# Patient Record
Sex: FEMALE | Race: WHITE | NOT HISPANIC OR LATINO | Employment: FULL TIME | URBAN - METROPOLITAN AREA
[De-identification: names, ages, dates, MRNs, and addresses within clinical notes are randomized per-mention and may not be internally consistent; named-entity substitution may affect disease eponyms.]

---

## 2017-01-30 ENCOUNTER — OFFICE VISIT - HEALTHEAST (OUTPATIENT)
Dept: FAMILY MEDICINE | Facility: CLINIC | Age: 47
End: 2017-01-30

## 2017-01-30 DIAGNOSIS — R53.83 FATIGUE: ICD-10-CM

## 2017-01-30 DIAGNOSIS — E03.9 HYPOTHYROIDISM: ICD-10-CM

## 2017-01-30 DIAGNOSIS — N92.0 EXCESSIVE OR FREQUENT MENSTRUATION: ICD-10-CM

## 2017-01-30 DIAGNOSIS — F41.9 ANXIETY: ICD-10-CM

## 2017-01-30 DIAGNOSIS — N76.0 VAGINITIS: ICD-10-CM

## 2017-01-31 ENCOUNTER — AMBULATORY - HEALTHEAST (OUTPATIENT)
Dept: FAMILY MEDICINE | Facility: CLINIC | Age: 47
End: 2017-01-31

## 2017-01-31 ENCOUNTER — COMMUNICATION - HEALTHEAST (OUTPATIENT)
Dept: FAMILY MEDICINE | Facility: CLINIC | Age: 47
End: 2017-01-31

## 2017-01-31 DIAGNOSIS — D64.9 ANEMIA: ICD-10-CM

## 2017-01-31 LAB
BASOPHILS # BLD AUTO: 0.1 THOU/UL (ref 0–0.2)
BASOPHILS NFR BLD AUTO: 1 % (ref 0–2)
EOSINOPHIL # BLD AUTO: 0.1 THOU/UL (ref 0–0.4)
EOSINOPHIL NFR BLD AUTO: 2 % (ref 0–6)
ERYTHROCYTE [DISTWIDTH] IN BLOOD BY AUTOMATED COUNT: 16.8 % (ref 11–14.5)
HCT VFR BLD AUTO: 30.5 % (ref 35–47)
HGB BLD-MCNC: 8.8 G/DL (ref 12–16)
LAB AP CHARGES (HE HISTORICAL CONVERSION): NORMAL
LYMPHOCYTES # BLD AUTO: 1.9 THOU/UL (ref 0.8–4.4)
LYMPHOCYTES NFR BLD AUTO: 23 % (ref 20–40)
MCH RBC QN AUTO: 20 PG (ref 27–34)
MCHC RBC AUTO-ENTMCNC: 28.9 G/DL (ref 32–36)
MCV RBC AUTO: 69 FL (ref 80–100)
MONOCYTES # BLD AUTO: 0.5 THOU/UL (ref 0–0.9)
MONOCYTES NFR BLD AUTO: 6 % (ref 2–10)
NEUTROPHILS # BLD AUTO: 5.3 THOU/UL (ref 2–7.7)
NEUTROPHILS NFR BLD AUTO: 67 % (ref 50–70)
PATH REPORT.COMMENTS IMP SPEC: NORMAL
PATH REPORT.COMMENTS IMP SPEC: NORMAL
PATH REPORT.FINAL DX SPEC: NORMAL
PATH REPORT.MICROSCOPIC SPEC OTHER STN: ABNORMAL
PATH REPORT.MICROSCOPIC SPEC OTHER STN: NORMAL
PATH REPORT.RELEVANT HX SPEC: NORMAL
PLATELET # BLD AUTO: 535 THOU/UL (ref 140–440)
PMV BLD AUTO: 9.8 FL (ref 8.5–12.5)
RBC # BLD AUTO: 4.41 MILL/UL (ref 3.8–5.4)
WBC: 7.9 THOU/UL (ref 4–11)

## 2017-02-01 ENCOUNTER — COMMUNICATION - HEALTHEAST (OUTPATIENT)
Dept: ONCOLOGY | Facility: CLINIC | Age: 47
End: 2017-02-01

## 2017-02-21 ENCOUNTER — OFFICE VISIT - HEALTHEAST (OUTPATIENT)
Dept: ONCOLOGY | Facility: CLINIC | Age: 47
End: 2017-02-21

## 2017-02-21 DIAGNOSIS — D50.9 IRON DEFICIENCY ANEMIA, UNSPECIFIED IRON DEFICIENCY ANEMIA TYPE: ICD-10-CM

## 2017-02-21 DIAGNOSIS — D50.9 IRON DEFICIENCY ANEMIA: ICD-10-CM

## 2017-03-01 ENCOUNTER — AMBULATORY - HEALTHEAST (OUTPATIENT)
Dept: LAB | Facility: HOSPITAL | Age: 47
End: 2017-03-01

## 2017-03-01 ENCOUNTER — COMMUNICATION - HEALTHEAST (OUTPATIENT)
Dept: ONCOLOGY | Facility: CLINIC | Age: 47
End: 2017-03-01

## 2017-03-01 DIAGNOSIS — D64.9 ANEMIA: ICD-10-CM

## 2017-03-02 ENCOUNTER — COMMUNICATION - HEALTHEAST (OUTPATIENT)
Dept: ONCOLOGY | Facility: CLINIC | Age: 47
End: 2017-03-02

## 2017-03-02 ENCOUNTER — AMBULATORY - HEALTHEAST (OUTPATIENT)
Dept: ONCOLOGY | Facility: CLINIC | Age: 47
End: 2017-03-02

## 2017-03-02 DIAGNOSIS — D64.9 ANEMIA: ICD-10-CM

## 2017-04-05 ENCOUNTER — RECORDS - HEALTHEAST (OUTPATIENT)
Dept: ADMINISTRATIVE | Facility: OTHER | Age: 47
End: 2017-04-05

## 2017-04-18 ENCOUNTER — COMMUNICATION - HEALTHEAST (OUTPATIENT)
Dept: FAMILY MEDICINE | Facility: CLINIC | Age: 47
End: 2017-04-18

## 2017-04-19 ENCOUNTER — OFFICE VISIT - HEALTHEAST (OUTPATIENT)
Dept: FAMILY MEDICINE | Facility: CLINIC | Age: 47
End: 2017-04-19

## 2017-04-19 DIAGNOSIS — Z00.00 ROUTINE GENERAL MEDICAL EXAMINATION AT A HEALTH CARE FACILITY: ICD-10-CM

## 2017-04-19 DIAGNOSIS — D22.9 ATYPICAL NEVI: ICD-10-CM

## 2017-04-19 DIAGNOSIS — F41.9 ANXIETY: ICD-10-CM

## 2017-04-19 DIAGNOSIS — E01.0 THYROMEGALY: ICD-10-CM

## 2017-04-19 ASSESSMENT — MIFFLIN-ST. JEOR: SCORE: 1363.46

## 2017-04-21 ENCOUNTER — COMMUNICATION - HEALTHEAST (OUTPATIENT)
Dept: ONCOLOGY | Facility: CLINIC | Age: 47
End: 2017-04-21

## 2017-04-24 ENCOUNTER — HOSPITAL ENCOUNTER (OUTPATIENT)
Dept: ULTRASOUND IMAGING | Facility: HOSPITAL | Age: 47
Discharge: HOME | End: 2017-04-24
Attending: FAMILY MEDICINE

## 2017-04-24 ENCOUNTER — HOSPITAL ENCOUNTER (OUTPATIENT)
Dept: MAMMOGRAPHY | Facility: HOSPITAL | Age: 47
Discharge: HOME | End: 2017-04-24
Attending: FAMILY MEDICINE

## 2017-04-24 DIAGNOSIS — E04.9 NONTOXIC GOITER, UNSPECIFIED: ICD-10-CM

## 2017-04-24 DIAGNOSIS — E01.0 THYROMEGALY: ICD-10-CM

## 2017-04-24 DIAGNOSIS — Z00.00 ROUTINE GENERAL MEDICAL EXAMINATION AT A HEALTH CARE FACILITY: ICD-10-CM

## 2017-04-24 LAB
HPV INTERPRETATION - HISTORICAL: NORMAL
HPV INTERPRETER - HISTORICAL: NORMAL

## 2017-04-26 ENCOUNTER — AMBULATORY - HEALTHEAST (OUTPATIENT)
Dept: ONCOLOGY | Facility: CLINIC | Age: 47
End: 2017-04-26

## 2017-04-26 DIAGNOSIS — D50.9 IRON DEFICIENCY ANEMIA: ICD-10-CM

## 2017-04-26 LAB
BKR LAB AP ABNORMAL BLEEDING: NO
BKR LAB AP BIRTH CONTROL/HORMONES: NORMAL
BKR LAB AP CERVICAL APPEARANCE: NORMAL
BKR LAB AP GYN ADEQUACY: NORMAL
BKR LAB AP GYN INTERPRETATION: NORMAL
BKR LAB AP HPV REFLEX: NORMAL
BKR LAB AP LMP: NORMAL
BKR LAB AP PATIENT STATUS: NO
BKR LAB AP PREVIOUS ABNORMAL: NO
BKR LAB AP PREVIOUS NORMAL: NORMAL
HIGH RISK?: NO
PATH REPORT.COMMENTS IMP SPEC: NORMAL
RESULT FLAG (HE HISTORICAL CONVERSION): NORMAL

## 2017-05-03 ENCOUNTER — AMBULATORY - HEALTHEAST (OUTPATIENT)
Dept: FAMILY MEDICINE | Facility: CLINIC | Age: 47
End: 2017-05-03

## 2017-05-03 DIAGNOSIS — E04.2 MULTIPLE THYROID NODULES: ICD-10-CM

## 2017-05-30 ENCOUNTER — RECORDS - HEALTHEAST (OUTPATIENT)
Dept: ADMINISTRATIVE | Facility: OTHER | Age: 47
End: 2017-05-30

## 2017-07-10 ENCOUNTER — COMMUNICATION - HEALTHEAST (OUTPATIENT)
Dept: ONCOLOGY | Facility: CLINIC | Age: 47
End: 2017-07-10

## 2017-07-10 ENCOUNTER — AMBULATORY - HEALTHEAST (OUTPATIENT)
Dept: FAMILY MEDICINE | Facility: CLINIC | Age: 47
End: 2017-07-10

## 2017-07-10 ENCOUNTER — COMMUNICATION - HEALTHEAST (OUTPATIENT)
Dept: FAMILY MEDICINE | Facility: CLINIC | Age: 47
End: 2017-07-10

## 2017-07-10 DIAGNOSIS — N28.9 RENAL INSUFFICIENCY: ICD-10-CM

## 2017-07-11 ENCOUNTER — HOSPITAL ENCOUNTER (OUTPATIENT)
Dept: ULTRASOUND IMAGING | Facility: HOSPITAL | Age: 47
Discharge: HOME | End: 2017-07-11
Attending: INTERNAL MEDICINE

## 2017-07-11 DIAGNOSIS — D50.9 IRON DEFICIENCY ANEMIA: ICD-10-CM

## 2017-07-29 ENCOUNTER — HEALTH MAINTENANCE LETTER (OUTPATIENT)
Age: 47
End: 2017-07-29

## 2017-08-07 ENCOUNTER — COMMUNICATION - HEALTHEAST (OUTPATIENT)
Dept: FAMILY MEDICINE | Facility: CLINIC | Age: 47
End: 2017-08-07

## 2017-08-07 DIAGNOSIS — F41.9 ANXIETY: ICD-10-CM

## 2017-12-07 ENCOUNTER — COMMUNICATION - HEALTHEAST (OUTPATIENT)
Dept: FAMILY MEDICINE | Facility: CLINIC | Age: 47
End: 2017-12-07

## 2018-02-27 ENCOUNTER — COMMUNICATION - HEALTHEAST (OUTPATIENT)
Dept: FAMILY MEDICINE | Facility: CLINIC | Age: 48
End: 2018-02-27

## 2018-05-06 ENCOUNTER — COMMUNICATION - HEALTHEAST (OUTPATIENT)
Dept: FAMILY MEDICINE | Facility: CLINIC | Age: 48
End: 2018-05-06

## 2018-05-06 DIAGNOSIS — F41.9 ANXIETY: ICD-10-CM

## 2018-10-26 ENCOUNTER — COMMUNICATION - HEALTHEAST (OUTPATIENT)
Dept: FAMILY MEDICINE | Facility: CLINIC | Age: 48
End: 2018-10-26

## 2018-10-26 ENCOUNTER — OFFICE VISIT - HEALTHEAST (OUTPATIENT)
Dept: FAMILY MEDICINE | Facility: CLINIC | Age: 48
End: 2018-10-26

## 2018-10-26 DIAGNOSIS — Z12.31 VISIT FOR SCREENING MAMMOGRAM: ICD-10-CM

## 2018-10-26 DIAGNOSIS — F41.9 ANXIETY: ICD-10-CM

## 2018-11-16 ENCOUNTER — HOSPITAL ENCOUNTER (OUTPATIENT)
Dept: MAMMOGRAPHY | Facility: CLINIC | Age: 48
Discharge: HOME | End: 2018-11-16
Attending: NURSE PRACTITIONER

## 2018-11-16 DIAGNOSIS — Z12.31 VISIT FOR SCREENING MAMMOGRAM: ICD-10-CM

## 2019-01-10 ENCOUNTER — COMMUNICATION - HEALTHEAST (OUTPATIENT)
Dept: FAMILY MEDICINE | Facility: CLINIC | Age: 49
End: 2019-01-10

## 2019-01-10 DIAGNOSIS — F41.9 ANXIETY: ICD-10-CM

## 2019-02-26 ENCOUNTER — OFFICE VISIT - HEALTHEAST (OUTPATIENT)
Dept: FAMILY MEDICINE | Facility: CLINIC | Age: 49
End: 2019-02-26

## 2019-03-01 ENCOUNTER — AMBULATORY - HEALTHEAST (OUTPATIENT)
Dept: FAMILY MEDICINE | Facility: CLINIC | Age: 49
End: 2019-03-01

## 2019-03-01 ENCOUNTER — OFFICE VISIT - HEALTHEAST (OUTPATIENT)
Dept: FAMILY MEDICINE | Facility: CLINIC | Age: 49
End: 2019-03-01

## 2019-03-01 DIAGNOSIS — G43.109 MIGRAINE WITH AURA AND WITHOUT STATUS MIGRAINOSUS, NOT INTRACTABLE: ICD-10-CM

## 2019-03-01 DIAGNOSIS — N89.8 VAGINAL DISCHARGE: ICD-10-CM

## 2019-03-01 LAB
CLUE CELLS: ABNORMAL
TRICHOMONAS, WET PREP: ABNORMAL
YEAST, WET PREP: ABNORMAL

## 2019-03-12 ENCOUNTER — COMMUNICATION - HEALTHEAST (OUTPATIENT)
Dept: SCHEDULING | Facility: CLINIC | Age: 49
End: 2019-03-12

## 2019-03-19 ENCOUNTER — OFFICE VISIT - HEALTHEAST (OUTPATIENT)
Dept: FAMILY MEDICINE | Facility: CLINIC | Age: 49
End: 2019-03-19

## 2019-03-19 DIAGNOSIS — R09.A2 GLOBUS SENSATION: ICD-10-CM

## 2019-03-19 DIAGNOSIS — E04.1 THYROID NODULE: ICD-10-CM

## 2019-03-19 DIAGNOSIS — N92.0 SPOTTING: ICD-10-CM

## 2019-03-19 DIAGNOSIS — F41.9 ANXIETY: ICD-10-CM

## 2019-03-19 DIAGNOSIS — D64.9 ANEMIA, UNSPECIFIED TYPE: ICD-10-CM

## 2019-03-19 DIAGNOSIS — R13.12 OROPHARYNGEAL DYSPHAGIA: ICD-10-CM

## 2019-03-19 LAB
ERYTHROCYTE [DISTWIDTH] IN BLOOD BY AUTOMATED COUNT: 13.2 % (ref 11–14.5)
HCT VFR BLD AUTO: 43.1 % (ref 35–47)
HGB BLD-MCNC: 14 G/DL (ref 12–16)
MCH RBC QN AUTO: 28.3 PG (ref 27–34)
MCHC RBC AUTO-ENTMCNC: 32.6 G/DL (ref 32–36)
MCV RBC AUTO: 87 FL (ref 80–100)
PLATELET # BLD AUTO: 314 THOU/UL (ref 140–440)
PMV BLD AUTO: 7.4 FL (ref 7–10)
RBC # BLD AUTO: 4.96 MILL/UL (ref 3.8–5.4)
TSH SERPL DL<=0.005 MIU/L-ACNC: 3.96 UIU/ML (ref 0.3–5)
WBC: 5.7 THOU/UL (ref 4–11)

## 2019-03-27 ENCOUNTER — OFFICE VISIT - HEALTHEAST (OUTPATIENT)
Dept: OTOLARYNGOLOGY | Facility: CLINIC | Age: 49
End: 2019-03-27

## 2019-03-27 DIAGNOSIS — K21.9 LARYNGOPHARYNGEAL REFLUX (LPR): ICD-10-CM

## 2019-03-27 DIAGNOSIS — J37.0 CHRONIC LARYNGITIS: ICD-10-CM

## 2019-04-02 ENCOUNTER — COMMUNICATION - HEALTHEAST (OUTPATIENT)
Dept: FAMILY MEDICINE | Facility: CLINIC | Age: 49
End: 2019-04-02

## 2019-04-02 DIAGNOSIS — D50.8 IRON DEFICIENCY ANEMIA SECONDARY TO INADEQUATE DIETARY IRON INTAKE: ICD-10-CM

## 2019-06-26 ENCOUNTER — COMMUNICATION - HEALTHEAST (OUTPATIENT)
Dept: FAMILY MEDICINE | Facility: CLINIC | Age: 49
End: 2019-06-26

## 2019-06-26 DIAGNOSIS — R51.9 HEADACHE: ICD-10-CM

## 2019-07-15 ENCOUNTER — COMMUNICATION - HEALTHEAST (OUTPATIENT)
Dept: FAMILY MEDICINE | Facility: CLINIC | Age: 49
End: 2019-07-15

## 2019-07-15 ENCOUNTER — OFFICE VISIT - HEALTHEAST (OUTPATIENT)
Dept: FAMILY MEDICINE | Facility: CLINIC | Age: 49
End: 2019-07-15

## 2019-07-15 DIAGNOSIS — F41.9 ANXIETY: ICD-10-CM

## 2019-09-11 ENCOUNTER — AMBULATORY - HEALTHEAST (OUTPATIENT)
Dept: FAMILY MEDICINE | Facility: CLINIC | Age: 49
End: 2019-09-11

## 2019-09-11 ENCOUNTER — HOSPITAL ENCOUNTER (OUTPATIENT)
Dept: ULTRASOUND IMAGING | Facility: HOSPITAL | Age: 49
Discharge: HOME | End: 2019-09-11
Attending: FAMILY MEDICINE

## 2019-09-11 DIAGNOSIS — E04.1 THYROID NODULE: ICD-10-CM

## 2019-09-19 ENCOUNTER — COMMUNICATION - HEALTHEAST (OUTPATIENT)
Dept: SCHEDULING | Facility: CLINIC | Age: 49
End: 2019-09-19

## 2019-09-19 ENCOUNTER — OFFICE VISIT - HEALTHEAST (OUTPATIENT)
Dept: FAMILY MEDICINE | Facility: CLINIC | Age: 49
End: 2019-09-19

## 2019-09-19 DIAGNOSIS — Z00.00 ROUTINE GENERAL MEDICAL EXAMINATION AT A HEALTH CARE FACILITY: ICD-10-CM

## 2019-09-19 DIAGNOSIS — N92.6 IRREGULAR PERIODS: ICD-10-CM

## 2019-09-19 DIAGNOSIS — F41.9 ANXIETY: ICD-10-CM

## 2019-09-19 LAB
ALBUMIN SERPL-MCNC: 4.3 G/DL (ref 3.5–5)
ALP SERPL-CCNC: 75 U/L (ref 45–120)
ALT SERPL W P-5'-P-CCNC: 12 U/L (ref 0–45)
ANION GAP SERPL CALCULATED.3IONS-SCNC: 11 MMOL/L (ref 5–18)
AST SERPL W P-5'-P-CCNC: 23 U/L (ref 0–40)
BILIRUB SERPL-MCNC: 0.5 MG/DL (ref 0–1)
BUN SERPL-MCNC: 19 MG/DL (ref 8–22)
CALCIUM SERPL-MCNC: 9.6 MG/DL (ref 8.5–10.5)
CHLORIDE BLD-SCNC: 104 MMOL/L (ref 98–107)
CHOLEST SERPL-MCNC: 228 MG/DL
CO2 SERPL-SCNC: 25 MMOL/L (ref 22–31)
CREAT SERPL-MCNC: 1.1 MG/DL (ref 0.6–1.1)
ERYTHROCYTE [DISTWIDTH] IN BLOOD BY AUTOMATED COUNT: 12.8 % (ref 11–14.5)
FASTING STATUS PATIENT QL REPORTED: YES
FERRITIN SERPL-MCNC: 64 NG/ML (ref 10–130)
FSH SERPL-ACNC: 111.3 MIU/ML
GFR SERPL CREATININE-BSD FRML MDRD: 53 ML/MIN/1.73M2
GLUCOSE BLD-MCNC: 43 MG/DL (ref 70–125)
HCT VFR BLD AUTO: 42.5 % (ref 35–47)
HDLC SERPL-MCNC: 83 MG/DL
HGB BLD-MCNC: 14.5 G/DL (ref 12–16)
IRON SATN MFR SERPL: 21 % (ref 20–50)
IRON SERPL-MCNC: 72 UG/DL (ref 42–175)
LDLC SERPL CALC-MCNC: 121 MG/DL
MCH RBC QN AUTO: 28.6 PG (ref 27–34)
MCHC RBC AUTO-ENTMCNC: 34.1 G/DL (ref 32–36)
MCV RBC AUTO: 84 FL (ref 80–100)
PLATELET # BLD AUTO: 307 THOU/UL (ref 140–440)
PMV BLD AUTO: 7.3 FL (ref 7–10)
POTASSIUM BLD-SCNC: 3.9 MMOL/L (ref 3.5–5)
PROT SERPL-MCNC: 7.2 G/DL (ref 6–8)
RBC # BLD AUTO: 5.08 MILL/UL (ref 3.8–5.4)
SODIUM SERPL-SCNC: 140 MMOL/L (ref 136–145)
TIBC SERPL-MCNC: 336 UG/DL (ref 313–563)
TRANSFERRIN SERPL-MCNC: 269 MG/DL (ref 212–360)
TRIGL SERPL-MCNC: 119 MG/DL
WBC: 5.1 THOU/UL (ref 4–11)

## 2019-09-19 ASSESSMENT — PATIENT HEALTH QUESTIONNAIRE - PHQ9: SUM OF ALL RESPONSES TO PHQ QUESTIONS 1-9: 1

## 2019-09-19 ASSESSMENT — ANXIETY QUESTIONNAIRES
1. FEELING NERVOUS, ANXIOUS, OR ON EDGE: SEVERAL DAYS
7. FEELING AFRAID AS IF SOMETHING AWFUL MIGHT HAPPEN: NOT AT ALL
3. WORRYING TOO MUCH ABOUT DIFFERENT THINGS: SEVERAL DAYS
IF YOU CHECKED OFF ANY PROBLEMS ON THIS QUESTIONNAIRE, HOW DIFFICULT HAVE THESE PROBLEMS MADE IT FOR YOU TO DO YOUR WORK, TAKE CARE OF THINGS AT HOME, OR GET ALONG WITH OTHER PEOPLE: SOMEWHAT DIFFICULT
GAD7 TOTAL SCORE: 4
4. TROUBLE RELAXING: NOT AT ALL
2. NOT BEING ABLE TO STOP OR CONTROL WORRYING: SEVERAL DAYS
5. BEING SO RESTLESS THAT IT IS HARD TO SIT STILL: NOT AT ALL
6. BECOMING EASILY ANNOYED OR IRRITABLE: SEVERAL DAYS

## 2019-09-20 ENCOUNTER — COMMUNICATION - HEALTHEAST (OUTPATIENT)
Dept: FAMILY MEDICINE | Facility: CLINIC | Age: 49
End: 2019-09-20

## 2019-12-23 ENCOUNTER — HOSPITAL ENCOUNTER (OUTPATIENT)
Dept: MAMMOGRAPHY | Facility: CLINIC | Age: 49
Discharge: HOME | End: 2019-12-23
Attending: NURSE PRACTITIONER

## 2019-12-23 DIAGNOSIS — Z12.31 VISIT FOR SCREENING MAMMOGRAM: ICD-10-CM

## 2020-03-13 ENCOUNTER — COMMUNICATION - HEALTHEAST (OUTPATIENT)
Dept: FAMILY MEDICINE | Facility: CLINIC | Age: 50
End: 2020-03-13

## 2020-03-24 ENCOUNTER — COMMUNICATION - HEALTHEAST (OUTPATIENT)
Dept: FAMILY MEDICINE | Facility: CLINIC | Age: 50
End: 2020-03-24

## 2020-03-24 DIAGNOSIS — F41.9 ANXIETY: ICD-10-CM

## 2020-09-21 ENCOUNTER — COMMUNICATION - HEALTHEAST (OUTPATIENT)
Dept: FAMILY MEDICINE | Facility: CLINIC | Age: 50
End: 2020-09-21

## 2020-09-21 DIAGNOSIS — F41.9 ANXIETY: ICD-10-CM

## 2021-04-01 ENCOUNTER — RECORDS - HEALTHEAST (OUTPATIENT)
Dept: FAMILY MEDICINE | Facility: CLINIC | Age: 51
End: 2021-04-01

## 2021-04-01 DIAGNOSIS — Z12.31 VISIT FOR SCREENING MAMMOGRAM: ICD-10-CM

## 2021-04-07 ENCOUNTER — OFFICE VISIT - HEALTHEAST (OUTPATIENT)
Dept: FAMILY MEDICINE | Facility: CLINIC | Age: 51
End: 2021-04-07

## 2021-04-07 ENCOUNTER — COMMUNICATION - HEALTHEAST (OUTPATIENT)
Dept: FAMILY MEDICINE | Facility: CLINIC | Age: 51
End: 2021-04-07

## 2021-04-07 DIAGNOSIS — F41.9 ANXIETY: ICD-10-CM

## 2021-04-07 ASSESSMENT — ANXIETY QUESTIONNAIRES
2. NOT BEING ABLE TO STOP OR CONTROL WORRYING: NOT AT ALL
7. FEELING AFRAID AS IF SOMETHING AWFUL MIGHT HAPPEN: NOT AT ALL
4. TROUBLE RELAXING: NOT AT ALL
GAD7 TOTAL SCORE: 1
5. BEING SO RESTLESS THAT IT IS HARD TO SIT STILL: NOT AT ALL
1. FEELING NERVOUS, ANXIOUS, OR ON EDGE: NOT AT ALL
3. WORRYING TOO MUCH ABOUT DIFFERENT THINGS: NOT AT ALL
6. BECOMING EASILY ANNOYED OR IRRITABLE: SEVERAL DAYS

## 2021-05-25 ENCOUNTER — RECORDS - HEALTHEAST (OUTPATIENT)
Dept: ADMINISTRATIVE | Facility: CLINIC | Age: 51
End: 2021-05-25

## 2021-05-26 ASSESSMENT — PATIENT HEALTH QUESTIONNAIRE - PHQ9: SUM OF ALL RESPONSES TO PHQ QUESTIONS 1-9: 1

## 2021-05-27 NOTE — TELEPHONE ENCOUNTER
"----- Message from Zaria Velasquez CMA sent at 4/2/2019 12:59 PM CDT -----  Regarding: Lab Order  Patient coming in for iron recheck:    \"Hemoglobin excellent. Suspect you will no longer need iron. Stop iron and recheck in one month, and then again in 3 months if stable.\"    Please place order, thanks!  "

## 2021-05-27 NOTE — PROGRESS NOTES
HPI: This patient is a 48yo F who presents for evaluation of the throat at the request of Dr. Meyer. There has been a globus sensation for a few months that has fluctuated in severity. She first noticed it after swallowing a dry pill. She does also note that she does have frequent throat clearing. Denies fevers, otalgia, weight loss, odynophagia, dysphagia, hemoptysis, and shortness of breath. Does not complain of sour taste, heartburn, or burping. Is not taking reflux medication.    Past medical history, surgical history, social history, family history, medications, and allergies have been reviewed with the patient and are documented above.    Review of Systems: a 10-system review was performed. Pertinent positives are noted in the HPI and on a separate scanned document in the chart.    PHYSICAL EXAMINATION:  GEN: no acute distress, normocephalic  EYES: extraocular movements are intact, pupils are equal and round. Sclera clear.   EARS: auricles are normally formed. The external auditory canals are clear with minimal to no cerumen. Tympanic membranes are intact bilaterally with no signs of infection, effusion, retractions, or perforations.  NOSE: anterior nares are patent. There are no masses or lesions. The septum is non-obstructing.  OC/OP: clear, dentition is in good repair. The tongue and palate are fully mobile and symmetric. No masses or lesions. Cobblestoning of the posterior pharyngeal wall.  HP/L (scope): nasopharynx, base of tongue, vallecula, epiglottis, and pyriform sinuses are clear. The bilateral vocal folds are mobile and without lesion. There is interarytenoid edema and erythema. Cobblestoning of the posterior pharyngeal wall  NECK: soft and supple. No lymphadenopathy or masses. Airway is midline.  NEURO: CN VII and XII symmetric. alert and oriented. No spontaneous nystagmus. Gait is normal.  PULM: breathing comfortably on room air, normal chest expansion with respiration  CARDS: no cyanosis or  clubbing, normal carotid pulses    THYROID U/S 2017:  CONCLUSION:  1.  Small bilateral thyroid nodules up to 1 cm.  2.  Otherwise, unremarkable exam.    MEDICAL DECISION-MAKING: This patient is a 50yo F with chronic laryngitis/globus sensation from acid reflux. She does have a multinodular goiter (all nodules are <1cm) that is scheduled for a re-ultrasound soon. Her thyroid is not the source for her symptoms. Discussed diet and lifestyle changes in addition to risks and benefits of H2 blocker vs PPI therapy.

## 2021-05-28 ASSESSMENT — ANXIETY QUESTIONNAIRES
GAD7 TOTAL SCORE: 1
GAD7 TOTAL SCORE: 4

## 2021-05-30 VITALS — WEIGHT: 155.8 LBS | BODY MASS INDEX: 22.2 KG/M2

## 2021-05-30 VITALS — BODY MASS INDEX: 20.96 KG/M2 | WEIGHT: 146.44 LBS | HEIGHT: 70 IN

## 2021-05-30 VITALS — WEIGHT: 151 LBS | BODY MASS INDEX: 21.51 KG/M2

## 2021-05-30 NOTE — TELEPHONE ENCOUNTER
RN cannot approve Refill Request    RN can NOT refill this medication PCP messaged that patient is overdue for Labs. Last office visit: 1/30/2017 Chloe Grimes MD Last Physical: 4/19/2017 Last MTM visit: Visit date not found Last visit same specialty: 3/19/2019 Maggie Meyer MD.  Next visit within 3 mo: Visit date not found  Next physical within 3 mo: Visit date not found      Juana Person, [facility] Triage/Med Refill 7/16/2019    Requested Prescriptions   Pending Prescriptions Disp Refills     venlafaxine (EFFEXOR-XR) 37.5 MG 24 hr capsule [Pharmacy Med Name: VENLAFAXINE ER 37.5MG CAPSULES] 90 capsule 3     Sig: TAKE 1 CAPSULE(37.5 MG) BY MOUTH DAILY       Venlafaxine/Desvenlafaxine Refill Protocol Failed - 7/15/2019  7:07 PM        Failed - LFT or AST or ALT in last year     Albumin   Date Value Ref Range Status   01/30/2017 3.9 3.5 - 5.0 g/dL Final     Bilirubin, Total   Date Value Ref Range Status   01/30/2017 0.3 0.0 - 1.0 mg/dL Final     Alkaline Phosphatase   Date Value Ref Range Status   01/30/2017 62 45 - 120 U/L Final     AST   Date Value Ref Range Status   01/30/2017 20 0 - 40 U/L Final     ALT   Date Value Ref Range Status   01/30/2017 10 0 - 45 U/L Final     Protein, Total   Date Value Ref Range Status   01/30/2017 6.8 6.0 - 8.0 g/dL Final                Failed - Fasting lipid cascade in last year     No results found for: CHOL, TRIG, HDL, LDLCALC, FASTING          Passed - PCP or prescribing provider visit in last year     Last office visit with prescriber/PCP: 1/30/2017 Chloe Grimes MD OR same dept: 3/19/2019 Maggie Meyer MD OR same specialty: 3/19/2019 Maggie Meyer MD  Last physical: 4/19/2017 Last MTM visit: Visit date not found   Next visit within 3 mo: Visit date not found  Next physical within 3 mo: Visit date not found  Prescriber OR PCP: Chloe Grimes MD  Last diagnosis associated with med order: 1. Anxiety  - venlafaxine (EFFEXOR-XR) 37.5 MG 24  hr capsule [Pharmacy Med Name: VENLAFAXINE ER 37.5MG CAPSULES]; TAKE 1 CAPSULE(37.5 MG) BY MOUTH DAILY  Dispense: 90 capsule; Refill: 3    If protocol passes may refill for 12 months if within 3 months of last provider visit (or a total of 15 months).             Passed - Blood Pressure in last year     BP Readings from Last 1 Encounters:   03/19/19 130/85

## 2021-05-30 NOTE — PATIENT INSTRUCTIONS - HE
Patient Education     If you have further questions regarding your plan of care, please call your provider at Phone: [phone]    If you were prescribed medication, be sure to fill your prescription and follow medication directions    If you experience any medication side effects or minor reactions, please contact us at Phone: [phone]    If you or your family member have suicidal thoughts, contact 911 or go to your nearest Emergency Room    [facility] Crisis  Adult [phone]  Child: [phone] [facility] Crisis  Adult: [phone]  Child: [phone] [facility] Crisis  [facility]   Adult/Child: [phone]   [facility] Crisis  Adult:  [phone]  Child:  [phone]     National Suicide Prevention Line:  1-234.222.5304    [facility]    [address] [place] [address]   [id number]  [phone]      Mobile Crisis Team  [facility]    Adults, 18 and older  COPE- [phone]    Children, ages 17 and younger:  Child Crisis- [phone] Mobile Crisis Team  [facility]    24/7 Mobile Team and Crisis Line  [phone]     Text [place] to [phone] and you will be connected with a counselor who will help defuse the crisis and connect you to local resources.  Crisis Text Line is available 24 hours a day, 7 days a week.

## 2021-05-30 NOTE — PROGRESS NOTES
Assessment:   The encounter diagnosis was Anxiety.     Plan:     Medications Ordered   Medications     venlafaxine (EFFEXOR XR) 37.5 MG 24 hr capsule     Sig: Take 1 capsule (37.5 mg total) by mouth daily.     Dispense:  30 capsule     Refill:  3     Patient Instructions       Patient Education     If you have further questions regarding your plan of care, please call your provider at Phone: [phone]    If you were prescribed medication, be sure to fill your prescription and follow medication directions    If you experience any medication side effects or minor reactions, please contact us at Phone: [phone]    If you or your family member have suicidal thoughts, contact 911 or go to your nearest Emergency Room    [facility] Crisis  Adult [phone]  Child: [phone] [facility] Crisis  Adult: [phone]  Child: [phone] [facility]  [facility]   Adult/Child: [phone]   [facility] Crisis  Adult:  [phone]  Child:  [phone]     National Suicide Prevention Line:  1-897.558.5872    [facility]    [address] [place] [address]   [address]  [phone]      Mobile Crisis Team  [facility]    Adults, 18 and older  COPE- [phone]    Children, ages 17 and younger:  Child Crisis- [phone] Mobile Crisis Team  [facility]    24/7 Mobile Team and Crisis Line  [phone]     Text MN to [phone] and you will be connected with a counselor who will help defuse the crisis and connect you to local resources.  Crisis Text Line is available 24 hours a day, 7 days a week.                           Return for further follow up if needed. Call [phone]) or schedule an appointment via [employer]..    Subjective:   Jolie Espinosa is a 49 y.o. female who submitted an eVisit request for evaluation of her No chief complaint on file..  See the questionnaire and message section of encounter report for information related  to history of present illness and review of systems.    Hello.  I have been taking Sertraline 50 MG x 1 /daily.   I feel that it was causing me to significantly increase my habit of jaw clenching (regularly waking up with jaw pain (i do use a mouth guard, as this is something I already had a tendency to do.)  I have been off the meds x ~1 month w/noticeable improvement regarding my jaw.  But, noticeable increase in anxiety. .. . decreased patience/coping.  I would like some help, but w/out the increase in jaw clenching.  Is there an alternative and/or an approach change that can be considered?       The following portions of the patient's history were reviewed and updated as appropriate:  She does not have any pertinent problems on file.  She is allergic to iodine and sulfa (sulfonamide antibiotics)..     Objective:   No exam performed today, patient submitted as eVisit.

## 2021-06-01 NOTE — PROGRESS NOTES
ASSESSMENT & PLAN:  1. Routine general medical examination at a health care facility  General physical exam, update labs follow for results.  Flu shot given by nursing.  - Influenza,Seasonal,Quad,INJ =/>6months  - Lipid [place] FASTING  - Comprehensive Metabolic Panel  - Ferritin  - Iron and Transferrin Iron Binding Capacity  - HM2(CBC w/o Differential)    2. Anxiety  Start Effexor, patient with ongoing anxiety and hot flashes starting to be bothersome.  Will hopefully help with both items and improve control.  Patient can return to med check in 6 months or sooner if needing refills wanting to change dose.  - venlafaxine (EFFEXOR-XR) 75 MG 24 hr capsule; Take 1 capsule (75 mg total) by mouth daily.  Dispense: 90 capsule; Refill: 1    3. Irregular periods  Check FSH to get better idea whether patient is perimenopausal or hormone levels are increasing.  - Follicle Stimulating Hormone (FSH)      There are no Patient Instructions on file for this visit.    Orders Placed This Encounter   Procedures     Influenza,Seasonal,Quad,INJ =/>6months     Lipid [place] FASTING     Order Specific Question:   Fasting is required?     Answer:   Yes     Comprehensive Metabolic Panel     Ferritin     Iron and Transferrin Iron Binding Capacity     HM2(CBC w/o Differential)     Follicle Stimulating Hormone (FSH)     Medications Discontinued During This Encounter   Medication Reason     venlafaxine (EFFEXOR-XR) 37.5 MG 24 hr capsule            General  Immunizations reviewed and updated .  Alcohol use, safety and moderation discussed.  Recommended adequate calcium intake/osteoporosis prevention.  Discussed colon cancer screening at age 50, 45 if -American.  Diet and exercise reviewed, including goal of aerobic exercise 30-90 minutes most days of the week, moderation of portions sizes, avoiding eating out and fast food and increase in fruits and vegetables.  Discussed safe sex practices.  Discussed & recommended seat belt (&  motorcycle helmet) use.  Discussed & recommended smoke detector.  Discussed sun protection.  Discussed weight management.        CHIEF COMPLAINT:  Chief Complaint   Patient presents with     Annual Exam     pt is not fasting        HISTORY OF PRESENT ILLNESS:  Jolie is a 49 y.o. female presenting to the clinic today for a physical examination.Patient feeling well with exception of some worsening anxiety.  Head is improved a little bit with recent job change.  Previously on low-dose of venlafaxine, after discussion discussed increasing.  Discussed how menopause or perimenopause can contribute to increasing anxiety.  Patient also noticing a little bit more irritability which could also be hormone change versus anxiety versus stress.  Is not expressing any depressive symptoms, no homicidal suicidal ideations.  Will check labs today and check hormone levels.  Has also had a history of iron deficiency well.  Date screenings and flu shot today.    REVIEW OF SYSTEMS:   All other systems are negative.    PFSH:  Social History:  The patient reports that there is not domestic violence in her life.     Regular Exercise: yes  Sunscreen Use: yes  Healthy Diet: yes  Dental Visits Regularly: yes  Sexually active: yes  Colonoscopy: yes  Prevention of Osteoporosis: yes   Last DXA: not applicable    Social History     Socioeconomic History     Marital status:      Spouse name: Not on file     Number of children: Not on file     Years of education: Not on file     Highest education level: Not on file   Occupational History     Not on file   Social Needs     Financial resource strain: Not on file     Food insecurity:     Worry: Not on file     Inability: Not on file     Transportation needs:     Medical: Not on file     Non-medical: Not on file   Tobacco Use     Smoking status: Former Smoker     Types: Cigarettes     Last attempt to quit:      Years since quittin.7     Smokeless tobacco: Never Used   Substance and  Sexual Activity     Alcohol use: Yes     Comment: 0-1     Drug use: Not on file     Sexual activity: Yes     Partners: Male     Birth control/protection: Surgical   Lifestyle     Physical activity:     Days per week: Not on file     Minutes per session: Not on file     Stress: Not on file   Relationships     Social connections:     Talks on phone: Not on file     Gets together: Not on file     Attends [employer] service: Not on file     Active member of club or organization: Not on file     Attends meetings of clubs or organizations: Not on file     Relationship status: Not on file     Intimate partner violence:     Fear of current or ex partner: Not on file     Emotionally abused: Not on file     Physically abused: Not on file     Forced sexual activity: Not on file   Other Topics Concern     Not on file   Social History Narrative     Not on file       Social History     Tobacco Use   Smoking Status Former Smoker     Types: Cigarettes     Last attempt to quit:      Years since quittin.7   Smokeless Tobacco Never Used       Family History:  Family History   Problem Relation Age of Onset     No Medical Problems Mother      No Medical Problems Father      No Medical Problems Sister      No Medical Problems Daughter      Colon cancer Maternal Grandmother 86     No Medical Problems Maternal Grandfather      No Medical Problems Paternal Grandmother      No Medical Problems Paternal Grandfather      No Medical Problems Maternal Aunt      No Medical Problems Paternal Aunt      No Medical Problems Son      No Medical Problems Son      BRCA 1/2 Neg Hx      Breast cancer Neg Hx      Cancer Neg Hx      Endometrial cancer Neg Hx      Ovarian cancer Neg Hx        Past Medical History:  Active Ambulatory Problems     Diagnosis Date Noted     Allergies      Blood In Urine      Premenstrual Disorder      Fatigue      Headache      Hypothyroidism      Sinusitis      Acute Upper Respiratory Infection      Menorrhagia       Ovarian Cyst      Abdominal Pain      Anemia 2017     Anxiety 2017     Multiple thyroid nodules 2017     Resolved Ambulatory Problems     Diagnosis Date Noted     No Resolved Ambulatory Problems     Past Medical History:   Diagnosis Date     Renal failure        Allergies   Allergen Reactions     Iodine Swelling     Sulfa (Sulfonamide Antibiotics)        Immunization History   Administered Date(s) Administered     Influenza, inj, historic,unspecified 10/01/2016, 2018     Influenza,seasonal,quad inj =/> 6months 2019     Td, Adult, Absorbed 2006     Tdap 2015     Immunization status: up to date and documented    Gynecologic History:  Patient's last menstrual period was 2019 (approximate).  Contraception:IUD placement  Last Pap: . Results were: normal  Last mammogram: . Results were: normal    OB History:  OB History        3    Para   3    Term   3            AB        Living           SAB        TAB        Ectopic        Multiple        Live Births                     Surgical History:  Past Surgical History:   Procedure Laterality Date     AUGMENTATION MAMMAPLASTY  2004     AZ ENLARGE BREAST Bilateral      AZ REMOVAL GALLBLADDER       AZ REMOVAL OF OVARIAN CYST(S)         VITALS:  Vitals:    19 1054   BP: 128/86   Patient Site: Left Arm   Patient Position: Sitting   Cuff Size: Adult Regular   Pulse: 66   Resp: 16   Weight: 160 lb (72.6 kg)     Wt Readings from Last 3 Encounters:   19 160 lb (72.6 kg)   19 165 lb 6.4 oz (75 kg)   19 164 lb 12.8 oz (74.8 kg)       PHYSICAL EXAM:  General Appearance: Reveals an alert, cooperative 49 year old female.   Vitals:  Per nursing notes.  Head: Normocephalic, without obvious abnormality, atraumatic   Eyes: PERRL, conjunctiva/corneas clear, EOM's intact   Ears: Normal TM's and external ear canals, both ears   Oropharynx: Nares normal, septum midline, mucosa normal, no drainage, lips,  and tongue normal   Neck: Supple, symmetrical, trachea midline, no adenopathy; thyroid: not enlarged, symmetric, no tenderness/mass/nodules   Back: Symmetric, no curvature, ROM normal, no CVA tenderness   Lungs: Clear to auscultation bilaterally, respirations unlabored, no wheezing or rales   Heart: Regular rate and rhythm, S1 and S2 normal, no murmur, rub, or gallop, no carotid bruits  Breasts: No breast masses, tenderness, asymmetry, or nipple discharge.   Abdomen: Soft, non-tender, no masses, no organomegaly,  Pelvic: declined  Extremities: Extremities normal, atraumatic, no cyanosis or edema   Skin: Skin color, texture, turgor normal, no rashes or lesions   Lymph nodes: Cervical, supraclavicular, and axillary nodes normal.  Neurologic: Normal   Psych: Normal affect. Does not appear anxious or depressed.     DATA REVIEWED:  Additional History from Old Records or Another Person Summarized (2 total): None.     Decision to Obtain Extra information (1 total): None.     Radiology Tests Summarized and Ordered (XRAY/CT/MRI/DXA) (1 total): None.    Labs Reviewed and Ordered (1 total): None.    Medicine Tests Summarized and Ordered (EKG/ECHO/COLONOSCOPY/EGD) (1 total): None.    Independent Review of EKG or X-Ray (2 each): None.    The visit lasted a total of 40 minutes face to face with the patient. Over 50% of the time was spent conducting the physical and in coordination of care.      MEDICATIONS:  Current Outpatient Medications   Medication Sig Dispense Refill     SUMAtriptan (IMITREX) 25 MG tablet Take 1 tablet (25 mg total) by mouth every 2 (two) hours as needed for migraine. 24 tablet 3     ferrous gluconate 324 mg (37.5 mg iron) Tab Take 324 mg by mouth 2 (two) times a day.       venlafaxine (EFFEXOR-XR) 75 MG 24 hr capsule Take 1 capsule (75 mg total) by mouth daily. 90 capsule 1     No current facility-administered medications for this visit.        Total Data Points:     Sarita Serra CNP    This note has been  dictated using voice recognition software. Any grammatical or context distortions are unintentional and inherent to the software

## 2021-06-01 NOTE — TELEPHONE ENCOUNTER
[facility] lab calling with critical lab value: Glucose 43 drawn at 1139 today order by aSrita Serra FNP . Called for Pt, Pt   Denies any of the following symptoms of hypoglycemia:  excess sweating, excessive hunger, fainting, fatigue, lightheadedness, or shakiness, nausea or vomiting, mental confusion or unresponsiveness, blurred vision, headache, irritability, slurred speech, or unsteadiness. On call provider was paged, Dr. Blanchardarity states pt has to follow up with PCP tomorrow. Pt was notified, no other concern at this time.           Pedro Luis Jones RN, [facility] Triage/Med Refill 9/19/2019 10:09 PM

## 2021-06-02 VITALS — WEIGHT: 166.13 LBS | BODY MASS INDEX: 23.67 KG/M2

## 2021-06-02 VITALS — BODY MASS INDEX: 23.48 KG/M2 | WEIGHT: 164.8 LBS

## 2021-06-02 VITALS — BODY MASS INDEX: 23.56 KG/M2 | WEIGHT: 165.4 LBS

## 2021-06-03 VITALS
HEART RATE: 66 BPM | RESPIRATION RATE: 16 BRPM | WEIGHT: 160 LBS | SYSTOLIC BLOOD PRESSURE: 128 MMHG | BODY MASS INDEX: 22.79 KG/M2 | DIASTOLIC BLOOD PRESSURE: 86 MMHG

## 2021-06-08 NOTE — PROGRESS NOTES
ASSESSMENT & PLAN:  1. Vaginitis  - Wet Prep, Vaginal    2. Anxiety    3. Fatigue  - Iron and Transferrin Iron Binding Capacity  - Vitamin B12  - Vitamin D, Total (25-Hydroxy)  - Thyroid Cascade  - Folate, Serum  - Comprehensive Metabolic Panel    4. Hypothyroidism    5. Menorrhagia  - Morphology,Smear Review (MORP)    Patient Instructions   Ordered labs today.     Psychology Referral: [facility] [phone]    Citalopram 20 mg: take 1/2 tablet per day for 6 days, then 1 tablet daily    Return in 2-3 months for a physical exam, fasting labs, pap smear, and anxiety recheck.     If you wish a Mirena (progesterone) IUD, a provider may place that for you here, or a referral to an OBGYN for an ablation.     If hemoglobin is mildly low, will start an iron supplement. If it is moderately low, will refer to hematologist.       Orders Placed This Encounter   Procedures     Wet Prep, Vaginal     Iron and Transferrin Iron Binding Capacity     Vitamin B12     Vitamin D, Total (25-Hydroxy)     Thyroid Cascade     Folate, Serum     Comprehensive Metabolic Panel     Morphology,Smear Review (MORP)     Medications Discontinued During This Encounter   Medication Reason     albuterol (PROVENTIL HFA;VENTOLIN HFA) 90 mcg/actuation inhaler Therapy completed     albuterol 2.5 mg/0.5 mL Nebu nebulizer solution Therapy completed     codeine-guaiFENesin (GUAIFENESIN AC)  mg/5 mL liquid Therapy completed     levoFLOXacin (LEVAQUIN) 500 MG tablet Therapy completed       Return in about 3 months (around 4/30/2017) for Annual physical.    CHIEF COMPLAINT:  Chief Complaint   Patient presents with     Anemia     fatigue, hx of low hemoglobin, requesting anemia work up     Vaginitis     discharge, itching, no antibiotic use recently, x 1 week     Anxiety     JHONY-7 done today       HISTORY OF PRESENT ILLNESS:  Jolie is a 46 y.o. female presenting to the clinic today for anemia, anxiety, and a possible vaginal  infection.     Anemia: She has had several health care providers tell her that her hemoglobin is low and she needs to be checked for anemia. On 7/28/2014 her hemoglobin was 11.6. She has very heavy periods and is constantly tired.     Vaginitis: She thinks she has a yeast infection, though she does not have the usual symptoms of malodor or white discharge and itching. She has discomfort and feels warmth in the vagina, with more moisture than usual and has light discharge.     Anxiety: She finds herself overreacting to difficult situations when they come and has difficulty controlling anxious feelings. She thinks her anxiety goes in waves and at times she is able to resist overreacting. She would like to try an anti-anxiety medication because Xanx was helpful when she used to take it when traveling to [facility]. She has done counseling in the past    REVIEW OF SYSTEMS:   Her cough is improved and her swollen lymph node in her neck has resolved.  All other systems are negative.    PFSH:  She has 3 kids and works. She had kidney failure in 2008 after delivering her daughter, but has completely recovered since then.     TOBACCO USE:  History   Smoking Status     Never Smoker   Smokeless Tobacco     Not on file       VITALS:  Vitals:    01/30/17 1334   BP: 112/68   Patient Site: Left Arm   Patient Position: Sitting   Cuff Size: Adult Regular   Pulse: 70   Resp: 18   Temp: 98.3  F (36.8  C)   TempSrc: Oral   Weight: 155 lb 12.8 oz (70.7 kg)     Wt Readings from Last 3 Encounters:   01/30/17 155 lb 12.8 oz (70.7 kg)   11/07/16 151 lb 8 oz (68.7 kg)   04/20/15 157 lb (71.2 kg)     Body mass index is 22.2 kg/(m^2).    PHYSICAL EXAM:  General Appearance: Alert, cooperative, no distress, appears stated age  Genitalia: Clear vaginal discharge with slight odor.     ADDITIONAL HISTORY SUMMARIZED (2): None.  DECISION TO OBTAIN EXTRA INFORMATION (1): None.   RADIOLOGY TESTS (1): None.  LABS (1): Ordered labs today. Reviewed labs from  7/28/2014.   MEDICINE TESTS (1): None.  INDEPENDENT REVIEW (2 each): None.     The visit lasted a total of 27 minutes face to face with the patient. Over 50% of the time was spent counseling and educating the patient about anemia, anxiety, and UTI.    IRemedios, am scribing for and in the presence of, Dr. Grimes.    I, Dr. Grimes personally performed the services described in this documentation, as scribed by Remedios Mckinnon in my presence, and it is both accurate and complete.    MEDICATIONS:  Current Outpatient Prescriptions   Medication Sig Dispense Refill     citalopram (CELEXA) 20 MG tablet Take 1 tablet (20 mg total) by mouth daily. 30 tablet 2     metroNIDAZOLE (METROGEL VAGINAL) 0.75 % vaginal gel One applicator at bedtime for 5 nights 70 g 0     Current Facility-Administered Medications   Medication Dose Route Frequency Provider Last Rate Last Dose     albuterol nebulizer solution 2.5 mg (PROVENTIL)  2.5 mg Nebulization Once Chloe Grimes MD           Total data points:1

## 2021-06-09 NOTE — CONSULTS
[facility] Hematology and Oncology Consult Note    Patient: Jolie Espinosa  MRN: [id number]  Date of Service: 02/21/2017      Reason for Visit  I was asked by Dr. Grimes regarding anemia    Assessment/Plan    ECOG Performance   ECOG Performance Status: 0  Distress Assessment  Distress Assessment Score: 5      Assessment:  A 46-year-old female with chronic hypochromic microcytic anemia.    We review her labs available in our system.  She has a completely normal hemogram in 2011.  Since 2013, she was noted to have microcytosis with mild anemia and thrombocytosis.  Most recently she has worsening microcytosis, increasing RDW and a worsening anemia.  Iron studies suggestive of iron deficiency although we do not have a ferritin level at the time of evaluation.     We reviewed the etiology of iron deficiency anemia and the sequale of untreated/sequelae of chronic anemia.There is no clinical evidence of obvious blood loss.  Even though there are documentation about menorrhagia, patient denied any history of heavy menstrual bleeding. She does not have chronic liver disease nor chronic kidney disease.  She does not have any clinical symptoms suggestive of celiac disease.  No history suggestive of poor iron absorption.  Primary bone marrow disorder is not likely.    I explained to the patient that we will need further evaluation to determine the source of iron loss.  I explained to the patient that most, and causes of iron deficiency in reproductive age woman is menstrual blood loss, therefore I would recommend to start with pelvic ultrasound to assess for gynecological causes of iron loss.  I explained to the patient that she will potentially need endoscopic evaluation if the ultrasound does not reveal any significant finding to explain the source of bleeding.  She was initially requesting me to replace with iron, rather than not looking for the source of bleeding.  I would not advise to do so.    We then discussed about  iron replacement by oral iron therapy versus IV iron therapy.  She has been tolerating iron therapy very well without any significant GI side effects.  Therefore I recommend to continue with oral iron supplementation.  We discussed about iron rich diet including red meat, poultry, seafood, green leafy vegetables, legumes, iron fortified grains and fruits.  Also discussed about limiting milk intake to 16-20 ounces a day and limit caffeinated drinks.        Plan:  - Labs today:  CBC/diff, retic count, ferritin, iron studies.   - Pelvic ultrasound.  - Encourage iron rich diet such as red meat, poultry, seafood, green leafy vegetables, iron fortified food.  Limit milk intake to 16-20 ozs a day.  - Will follow-up with the results by phone for further management recommendation.    ______________________________________________________________________________    History  Ms. Jolie Espinosa is a 46 y.o. female presents today.    She reported that she was told that her hemoglobin is slightly low in the last couple years from routine blood work.  She was recommended to do further workup but she failed to do so.  About a month ago, she follow-up with her primary care provider after a prolonged upper surgery tract infection and palpable lymph node in her neck.  By the time she went to see her doctor, all her symptoms were gone, but fatigue was persisted.  At that point, her hemogram was checked and it showed microcytic anemia (MCV 69) with hemoglobin of 8.8 g/dL.  She has normal white count and thrombocytosis with platelet of 535.  Peripheral smear showed marked hypochromic microcytic anemia, and thrombocytosis otherwise unremarkable.  High serum iron was low at 15, transferrin saturation of 3, TIBC 481.  She has normal TSH, vitamin B12 and folate.  She has normal liver and kidney function.  She was started on oral iron tablets one tablet twice a day about 3 weeks now.  She denies any stomach discomfort.  She has mild  constipation but able to manage with dietary modification.    Upon further questioning, she reported that her menstrual cycle lasted about 5 days and first 1-2 days heavier but she denied excessive bleeding nor passing clots.  She did not recall any obvious bleeding from GI tract or  tract.  She denies any frequent nosebleed.  No excessive gum bleeding.  She denies any chronic abdominal pain, bloating or diarrhea.  She drinks 1 cup of coffee and a couple of milk a day.  She does not donate blood.  She never had blood transfusion.  She never had a bowel surgery.  No previous history of endoscopies.    Apart from that, the remainder of comprehensive ROS was negative.    Past History  Patient Active Problem List    Diagnosis Date Noted     Anemia 2017     Ovarian Cyst      Abdominal Pain      Allergies      Blood In Urine      Premenstrual Disorder      Fatigue      Headache      Hypothyroidism      Sinusitis      Acute Upper Respiratory Infection      Menorrhagia       Acute kidney injury in .    [id number]  Status post cholecystectomy    Social history  She quit smoking in .  She used to smoke 2 cigarettes a day for about 10 years.  She does not drink alcohol.  She denies any recreational drug use.  She is an .    Family history  Paternal grandmother-ovarian cancer in her 90s  Maternal grandmother-colon cancer in her 90s  Maternal grandfather - brain tumor (details unknown)    Medications  Current Outpatient Prescriptions on File Prior to Visit   Medication Sig Dispense Refill     citalopram (CELEXA) 20 MG tablet Take 1 tablet (20 mg total) by mouth daily. 30 tablet 2     [DISCONTINUED] metroNIDAZOLE (METROGEL VAGINAL) 0.75 % vaginal gel One applicator at bedtime for 5 nights 70 g 0     Current Facility-Administered Medications on File Prior to Visit   Medication Dose Route Frequency Provider Last Rate Last Dose     [DISCONTINUED] albuterol nebulizer solution 2.5 mg (PROVENTIL)  2.5 mg  Nebulization Once Chloe Grimes MD             Allergies    Allergies   Allergen Reactions     Sulfa (Sulfonamide Antibiotics)        Review of Systems  Pain  Currently in Pain: No/denies    Physical Exam    Recent Vitals 2/21/2017   Weight 151 lbs   /72   Pulse 72   Temp 98   Temp src -   SpO2 99     General: alert, awake, not in acute distress  HEENT: Head: Normal, normocephalic, atraumatic.  Eye: Normal external eye, conjunctiva, lids cornea, JASON.  Ears: Normal TM's bilaterally. Normal auditory canals and external ears. Non-tender.  Nose: Normal external nose, mucus membranes and septum.  Pharynx: Dental Hygiene adequate. Normal buccal mucosa. Normal pharynx.  Neck / Thyroid: Supple, no masses, nodes, nodules or enlargement.  Lymphatics: No abnormally enlarged lymph nodes.  Chest: Normal chest wall and respirations. Clear to auscultation.  Heart: S1 S2 RRR, no murmur.   Abdomen: abdomen is soft without significant tenderness, masses, organomegaly or guarding  Extremities: normal strength, tone, and muscle mass  Skin: normal. no rash or abnormalities  CNS: non focal.    Lab Results    Recent Results (from the past 168 hour(s))   HM1 (CBC with Diff)   Result Value Ref Range    WBC 8.3 4.0 - 11.0 thou/uL    RBC 5.25 3.80 - 5.40 mill/uL    Hemoglobin 11.0 (L) 12.0 - 16.0 g/dL    Hematocrit 35.9 35.0 - 47.0 %    MCV 68 (L) 80 - 100 fL    MCH 20.9 (L) 27.0 - 34.0 pg    MCHC 30.7 (L) 32.0 - 36.0 g/dL    RDW 24.1 (H) 11.0 - 14.5 %    Platelets 441 (H) 140 - 440 thou/uL    MPV 7.7 7.0 - 10.0 fL    Neutrophils % 71 (H) 50 - 70 %    Lymphocytes % 21 20 - 40 %    Monocytes % 6 2 - 10 %    Eosinophils % 1 0 - 6 %    Basophils % 1 0 - 2 %    Neutrophils Absolute 5.9 2.0 - 7.7 thou/uL    Lymphocytes Absolute 1.7 0.8 - 4.4 thou/uL    Monocytes Absolute 0.5 0.0 - 0.9 thou/uL    Eosinophils Absolute 0.1 0.0 - 0.4 thou/uL    Basophils Absolute 0.1 0.0 - 0.2 thou/uL       Imaging Results    No results found.      Signed  by: Arlette Sanchez MD

## 2021-06-10 NOTE — PROGRESS NOTES
Assessment/Plan:  1. Routine general medical examination at a health care facility  Mammo Screening Bilateral    Gynecologic Cytology (PAP Smear)   2. Thyromegaly  US Thyroid   3. Atypical nevi  Ambulatory referral to Dermatology    CANCELED: Ambulatory referral to Dermatology   4. Anxiety       Patient is a 47 y.o. female here for physical exam. See health maintenance section below. Labs as ordered.      Return in 1 year for routine physical exam and medication check.     Call hematologist for follow up regarding anemia.     Send or bring in lab results from work.     Ordered thyroid ultrasound.     Referral given for dermatology.     HPI    Chief Complaint   Patient presents with     Annual Exam     physical, w/ pap , pt is fasting      Follow-up     f/u anxiety     Anxiety: She is taking citalopram 20 mg daily. She had side effects of tiredness and constant yawning, and after 1 month, her side effects subsided and her anxiety has greatly improved. Her mood feels more level, and is able to handle stress of 3 young children and moving houses. She is sleeping better. She wants to continue the medication. Her PHQ-9 today is 0. Her JHONY-7 score today is 0.     Anemia: She has had a significant work up for anemia since January, 2017. Her hemoglobin levels were 8.8 on 1/30/17. She started taking an iron supplement, and her hemoglobin increased to 11 on 2/21/17. She seen by hematologist  Dr. Arlette Sanchez MD, on 2/21/17 and will follow up there.  She has heavy periods, but was not considered heavy by the hematologist. On 2/21/17, her occult blood stool test positive. She then had a colonoscopy and endoscopy 3 weeks ago for symptoms of blood in the stool, and both tests returned normal with normal biopsies.    Health Maintenance   Topic Date Due     ADVANCE DIRECTIVES DISCUSSED WITH PATIENT  Declined     MAMMOGRAM  Last done in 2014, recommend yearly, will order     INFLUENZA VACCINE RULE BASED (1) Had last fall at work,  "due in the fall     PAP SMEAR  Last done in 2013, will do today      TD 18+ HE  04/20/2025     TDAP ADULT ONE TIME DOSE  Completed       Patient Active Problem List   Diagnosis     Allergies     Blood In Urine     Premenstrual Disorder     Fatigue     Headache     Hypothyroidism     Sinusitis     Acute Upper Respiratory Infection     Menorrhagia     Ovarian Cyst     Abdominal Pain     Anemia     Current Outpatient Prescriptions   Medication Sig     citalopram (CELEXA) 20 MG tablet Take 1 tablet (20 mg total) by mouth daily.     ferrous gluconate 324 mg (37.5 mg iron) Tab Take 324 mg by mouth 2 (two) times a day.     vit B cmplx 3-FA-vit C-biotin (NEPHRO-TYREL) 1- mg-mg-mcg Tab tablet Take 1 tablet by mouth daily.       Patient is a 47 y.o. female presents for a physical exam.    The following portions of the patient's history were reviewed and updated as appropriate: allergies, current medications, past family history, past medical history, past social history, past surgical history and problem list.    Review of Systems  She had blood sugar and cholesterol labs done every year at work and will send her lab results here. She is not on any birth control because her  had a vasectomy.   Pertinent items are noted in HPI.  A 12 point comprehensive review of systems was negative except as noted.     Immunization History   Administered Date(s) Administered     Tdap 04/20/2015     No results found for this or any previous visit (from the past 240 hour(s)).     I have had an Advance Directives discussion with the patient.  The following are part of a depression follow up plan for the patient:  mental health care assessment and mental health care management     Objective:    /78 (Patient Site: Right Arm, Patient Position: Sitting, Cuff Size: Adult Regular)  Pulse (!) 56  Temp 98.1  F (36.7  C) (Oral)   Resp 16  Ht 5' 10.25\" (1.784 m)  Wt 146 lb 7 oz (66.4 kg)  LMP 03/29/2017  BMI 20.86 " kg/m2      General Appearance:    Alert, cooperative, no distress, appears stated age   Head:    Normocephalic, without obvious abnormality, atraumatic   Eyes:    PERRL, conjunctiva/corneas clear, EOM's intact both eyes   Ears:    Normal TM's and external ear canals, both ears   Nose:   Nares normal, septum midline, mucosa normal, no drainage    or sinus tenderness   Throat:   Lips, mucosa, and tongue normal; teeth and gums normal   Neck:   Supple, symmetrical, trachea midline, no adenopathy;     thyroid:  No tenderness/nodules, mild thyromegaly   Back:     Symmetric, no curvature, ROM normal, no CVA tenderness   Lungs:     Clear to auscultation bilaterally, respirations unlabored   Chest Wall:    No tenderness or deformity    Heart:    Regular rate and rhythm, S1 and S2 normal, no murmur, rub   or gallop   Breast Exam:    No tenderness, masses, or nipple abnormality   Abdomen:     Soft, non-tender, bowel sounds active all four quadrants,     no masses, no organomegaly   Genitalia:    Normal female without lesion, discharge or tenderness   Extremities:   Extremities normal, atraumatic, no cyanosis or edema   Pulses:   2+ and symmetric all extremities   Skin:   Skin color, texture, turgor normal, no rashes, irregular nevi on face and back   Lymph nodes:   Cervical, supraclavicular, and axillary nodes normal   Neurologic:   CNII-XII intact, normal strength, sensation and reflexes     throughout       Data Points:  Additional History Summarized (2): Reviewed hematology note from 2/21/17 regarding anemia.   Medicine Tests (1): Reviewed colonoscopy and oncology   Labs (1): Reviewed labs from 1/30/17. Ordered labs today.      The visit lasted a total of 26 minutes face to face with the patient. Over 50% of the time was spent counseling and educating the patient about anemia and exam.    Remedios MADRID, am scribing for and in the presence of, Dr. Chloe Grimes MD.    IDr. Chloe MD, personally  performed the services described in this documentation, as scribed by Remedios Mckinnon in my presence, and it is both accurate and complete.    Total Data Points: 4

## 2021-06-11 NOTE — TELEPHONE ENCOUNTER
It looks like patient is due for physical, please help her set 1 at my first available opening limiting to 4 physicals a day.  Thank you.

## 2021-06-11 NOTE — TELEPHONE ENCOUNTER
RN cannot approve Refill Request    RN can NOT refill this medication PCP messaged that patient is overdue for Labs. Last office visit: 1/30/2017 Chloe Grimes MD Last Physical: 4/19/2017 Last MTM visit: Visit date not found Last visit same specialty: 3/19/2019 Maggie Meyer MD.  Next visit within 3 mo: Visit date not found  Next physical within 3 mo: Visit date not found      Adriane Walker, [facility] Triage/Med Refill 9/23/2020    Requested Prescriptions   Pending Prescriptions Disp Refills     venlafaxine (EFFEXOR-XR) 75 MG 24 hr capsule [Pharmacy Med Name: VENLAFAXINE HCL ER 75 MG CAP] 90 capsule 1     Sig: TAKE 1 CAPSULE BY MOUTH EVERY DAY       Venlafaxine/Desvenlafaxine Refill Protocol Failed - 9/21/2020 12:52 AM        Failed - LFT or AST or ALT in last year     Albumin   Date Value Ref Range Status   09/19/2019 4.3 3.5 - 5.0 g/dL Final     Bilirubin, Total   Date Value Ref Range Status   09/19/2019 0.5 0.0 - 1.0 mg/dL Final     Alkaline Phosphatase   Date Value Ref Range Status   09/19/2019 75 45 - 120 U/L Final     AST   Date Value Ref Range Status   09/19/2019 23 0 - 40 U/L Final     ALT   Date Value Ref Range Status   09/19/2019 12 0 - 45 U/L Final     Protein, Total   Date Value Ref Range Status   09/19/2019 7.2 6.0 - 8.0 g/dL Final                Failed - Fasting lipid cascade in last year     Cholesterol   Date Value Ref Range Status   09/19/2019 228 (H) <=199 mg/dL Final     Triglycerides   Date Value Ref Range Status   09/19/2019 119 <=149 mg/dL Final     HDL Cholesterol   Date Value Ref Range Status   09/19/2019 83 >=50 mg/dL Final     LDL Calculated   Date Value Ref Range Status   09/19/2019 121 <=129 mg/dL Final     Patient Fasting > 8hrs?   Date Value Ref Range Status   09/19/2019 Yes  Final             Failed - PCP or prescribing provider visit in last year     Last office visit with prescriber/PCP: 1/30/2017 Chloe Grimes MD OR same dept: Visit date not found OR  same specialty: 3/19/2019 Maggie Meyer MD  Last physical: 4/19/2017 Last MTM visit: Visit date not found   Next visit within 3 mo: Visit date not found  Next physical within 3 mo: Visit date not found  Prescriber OR PCP: Chloe Grimes MD  Last diagnosis associated with med order: 1. Anxiety  - venlafaxine (EFFEXOR-XR) 75 MG 24 hr capsule [Pharmacy Med Name: VENLAFAXINE HCL ER 75 MG CAP]; TAKE 1 CAPSULE BY MOUTH EVERY DAY  Dispense: 90 capsule; Refill: 1    If protocol passes may refill for 12 months if within 3 months of last provider visit (or a total of 15 months).             Failed - Blood Pressure in last year     BP Readings from Last 1 Encounters:   09/19/19 128/86

## 2021-06-15 PROBLEM — F41.9 ANXIETY: Status: ACTIVE | Noted: 2017-04-19

## 2021-06-15 PROBLEM — D64.9 ANEMIA: Status: ACTIVE | Noted: 2017-01-31

## 2021-06-15 PROBLEM — E04.2 MULTIPLE THYROID NODULES: Status: ACTIVE | Noted: 2017-04-25

## 2021-06-16 NOTE — PATIENT INSTRUCTIONS - HE
Patient Education     If you have further questions regarding your plan of care, please call your provider at Phone: [phone]    If you were prescribed medication, be sure to fill your prescription and follow medication directions    If you experience any medication side effects or minor reactions, please contact us at Phone: [phone]    If you or your family member have suicidal thoughts, contact 911 or go to your nearest Emergency Room    [facility]  Adult [phone]  Child: [phone] [facility]  Adult: [phone]  Child: [phone] [facility]  [facility]   Adult/Child: [phone]   [facility]  Adult:  [phone]  Child:  [phone]     National Suicide Prevention Line:  1-790.527.1145    [facility]    [address] [place] [address]   [address]  [phone]      Mobile Crisis Team  [facility]    Adults, 18 and older  COPE- [phone]    Children, ages 17 and younger:  Child Crisis- [phone] Mobile Crisis Team  [facility]    24/7 Mobile Team and Crisis Line  [phone]     Text [place] to [phone] and you will be connected with a counselor who will help defuse the crisis and connect you to local resources.  Crisis Text Line is available 24 hours a day, 7 days a week.

## 2021-06-16 NOTE — PROGRESS NOTES
Assessment:   The encounter diagnosis was Anxiety.     Plan:     Medications Ordered   Medications     venlafaxine (EFFEXOR-XR) 75 MG 24 hr capsule     Sig: TAKE 1 CAPSULE BY MOUTH EVERY DAY     Dispense:  90 capsule     Refill:  3     Patient Instructions       Patient Education     If you have further questions regarding your plan of care, please call your provider at Phone: [phone]    If you were prescribed medication, be sure to fill your prescription and follow medication directions    If you experience any medication side effects or minor reactions, please contact us at Phone: [phone]    If you or your family member have suicidal thoughts, contact 911 or go to your nearest Emergency Room    [facility]  Adult [phone]  Child: [phone] [facility]  Adult: [phone]  Child: [phone] [facility]  [facility]   Adult/Child: [phone]   [facility]  Adult:  [phone]  Child:  [phone]     National Suicide Prevention Line:  1-617.558.1458    [facility]    [address] [place] [address]   [id number]  [phone]      Mobile Crisis Team  [facility]    Adults, 18 and older  COPE- [phone]    Children, ages 17 and younger:  Child Crisis- [phone] Mobile Crisis Team  [facility]    24/7 Mobile Team and Crisis Line  [phone]     Text [place] to [phone] and you will be connected with a counselor who will help defuse the crisis and connect you to local resources.  Crisis Text Line is available 24 hours a day, 7 days a week.                           Return for further follow up if needed. Call [phone]) or schedule an appointment via [employer]..    Subjective:   Jolie Espinosa is a 51 y.o. female who submitted an eVisit request for evaluation of her Depression (Entered automatically based on patient selection in [employer].).  See the questionnaire and message section of encounter  report for information related to history of present illness and review of systems.    The following portions of the patient's history were reviewed and updated as appropriate:  She does not have any pertinent problems on file.  She is allergic to iodine and sulfa (sulfonamide antibiotics)..     Objective:   No exam performed today, patient submitted as eVisit.  Provider E-Visit time total (minutes): 6

## 2021-06-21 NOTE — PROGRESS NOTES
ASSESSMENT:  1. Anxiety  sertraline (ZOLOFT) 25 MG tablet    Ambulatory referral to Psychology   2. Visit for screening mammogram  Mammo Screening Bilateral       PLAN:  Therapy at this time.  Referral placed for psychology.  Prescription sent for sertraline.  We will start her at a low dose and as long as having no side effects we could slowly increase her to 50 mg a day.  Patient will let us know if desiring an increase in dosage.  No problem-specific Assessment & Plan notes found for this encounter.      There are no Patient Instructions on file for this visit.    Orders Placed This Encounter   Procedures     Mammo Screening Bilateral     Standing Status:   Future     Standing Expiration Date:   1/26/2020     Order Specific Question:   Patient's previous breast density:     Answer:   Scattered fibroglandular density [2]     Order Specific Question:   Is the patient pregnant?     Answer:   No     Order Specific Question:   Can the procedure be changed per Radiologist protocol?     Answer:   Yes     Ambulatory referral to Psychology     Referral Priority:   Routine     Referral Type:   Behavioral Health     Referral Reason:   Evaluation and Treatment     Number of Visits Requested:   1     Medications Discontinued During This Encounter   Medication Reason     citalopram (CELEXA) 20 MG tablet Therapy completed       No Follow-up on file.    CHIEF COMPLAINT:  Chief Complaint   Patient presents with     Anxiety       HISTORY OF PRESENT ILLNESS:  Jolie is a 48 y.o. female here today to discuss anxiety.  Patient feels that over the last several months anxiety has increased.  She has dealt with anxiety symptoms throughout her life on and off and been on medication at times as well.  Typically she will feel better once on medication and then decide to wean down off of it or stop.  She then will have an increase again in symptoms.  States that generally speaking life stressors have gotten on top of her, she feels she is  not able to manage at all without becoming quite irritable and short with her family members.  She feels that she is not functioning optimally at work and unable to focus as well as typical.  No consistent depressive symptoms, no suicidal or homicidal ideations.  She states that she feels her life is going well and she is not sure why she cannot control symptoms but at this time she feels that stressors are somewhat overwhelming.  She has had some panic type symptoms but nothing consistent.    REVIEW OF SYSTEMS:      Pertinent items are noted in HPI.  All other systems are negative  PFSH:  Reviewed, no changes      TOBACCO USE:  History   Smoking Status     Former Smoker     Types: Cigarettes     Quit date: 2007   Smokeless Tobacco     Never Used       VITALS:  Vitals:    10/26/18 1152   BP: (!) 141/91   Patient Site: Left Arm   Patient Position: Sitting   Cuff Size: Adult Regular   Pulse: 62   Resp: 14   Weight: 166 lb 2 oz (75.4 kg)     Wt Readings from Last 3 Encounters:   10/26/18 166 lb 2 oz (75.4 kg)   04/19/17 146 lb 7 oz (66.4 kg)   02/21/17 151 lb (68.5 kg)       PHYSICAL EXAM:   BP (!) 141/91 (Patient Site: Left Arm, Patient Position: Sitting, Cuff Size: Adult Regular)  Pulse 62  Resp 14  Wt 166 lb 2 oz (75.4 kg)  LMP 09/26/2018  BMI 23.67 kg/m2  General appearance: alert, appears stated age and cooperative  Lungs: clear to auscultation bilaterally  Heart: regular rate and rhythm, S1, S2 normal, no murmur, click, rub or gallop  Neurologic: Grossly normal  Psychologic: Mood and affect normal.      DATA REVIEWED:  Additional History from Old Records Summarized (2): 0  Decision to Obtain Records (1): 0  Radiology Tests Summarized or Ordered (1): 0  Labs Reviewed or Ordered (1): 0  Medicine Test Summarized or Ordered (1): 0  Independent Review of EKG or X-RAY(2 each): 0    The visit lasted a total of 25 minutes face to face with the patient. Over 50% of the time was spent counseling and educating the  patient about plan of care.    MEDICATIONS:  Current Outpatient Prescriptions   Medication Sig Dispense Refill     ferrous gluconate 324 mg (37.5 mg iron) Tab Take 324 mg by mouth 2 (two) times a day.       vit B cmplx 3-FA-vit C-biotin (NEPHRO-TYREL) 1- mg-mg-mcg Tab tablet Take 1 tablet by mouth daily.       sertraline (ZOLOFT) 25 MG tablet Take 1 tablet (25 mg total) by mouth daily. 30 tablet 2     No current facility-administered medications for this visit.        This note has been dictated using voice recognition software. Any grammatical or context distortions are unintentional and inherent to the software

## 2021-06-24 NOTE — TELEPHONE ENCOUNTER
Talked with patient and gave covering providers recommendations. Patient states she will continue to monitor and will go in if symptoms worsen and cannot swallow foods or drink water.

## 2021-06-24 NOTE — TELEPHONE ENCOUNTER
Caller indicates she is in [place] and concerned about an issue with her throat       About a month ago she took her usual HS meds and had the sensation that something was stuck in her throat for some time - eventually resolved     This then occurred last night and she went onto the Internet and responses indicated that this sensation could be either stress related or a sign of throat cancer       A/P:   > As you are outside of [place] area, I am unable to provide any triage assistance  other than to offer to message your provider       She indicated she would call back and not report that she was outside of [place] / [place] to be able to get assistance but also asked to have her provider call her back.  I also indicated that if she was concerned with what she was experiencing, she copuld seek medical attention at a local care facility          Tele# [phone]       I will message provider     Lam Kelley, RN   Triage and Medication Refills

## 2021-06-24 NOTE — TELEPHONE ENCOUNTER
"Pt currently in [place] -> (does not state this until end of triage conversation when offering clinical eval options).    Pt states \"A month ago I swallowed a pill without much water.\"  \"Throat felt irritated afterward.\"  Irritation went away after two days.    Now again \"swallowed a pill with very little water\" -> last evening.  \"Felt like it got stuck in the same spot down low in  the esophagus.\"  \"Ate and drank a bunch of crackers and water.\"  Able to sleep well overnight.  Pt suspects no actual pill is stuck.    Now discussing clinical eval options such as [facility], and pt admits \"currently on vacation in [place].\"  Therefore did not give any care advice, as triage is not authorized for patients not in [place] or [place].    What to do?  - ER in [place]?  - Wait for clinic appt with primary clinic?  Okay to leave a detailed message on voicemail.    Margarita Hollins RN BSBA  [facility] RN Triage     Reason for Disposition    [1] Symptoms of pill stuck in throat or esophagus (e.g., pain in throat or chest, FB sensation) AND [2] no relief after using CARE ADVICE     No actual pill stuck (pt able to eat/drink as usual), however sensation persists.    Difficulty swallowing is a chronic symptom (recurrent or ongoing AND present > 4 weeks)     Symptom has occurred before when swallowing pills with insufficient water.    Protocols used: SWALLOWING DIFFICULTY-A-AH      "

## 2021-06-24 NOTE — TELEPHONE ENCOUNTER
If further throat sxs while in [place] then seek evaluation at a [facility].  Otherwise could be seen upon her return for a clinic visit.

## 2021-06-24 NOTE — PROGRESS NOTES
ASSESSMENT:  1. Vaginal discharge  Wet Prep, Vaginal   2. Migraine with aura and without status migrainosus, not intractable         PLAN:  Wet prep positive for yeast. Treat with diflucan.  Try Imitrex for headaches. If becoming more frequent could try a preventative medication at some point. Likely related to perimenopausal changes, continue to monitor.  No problem-specific Assessment & Plan notes found for this encounter.      There are no Patient Instructions on file for this visit.    Orders Placed This Encounter   Procedures     Wet Prep, Vaginal     There are no discontinued medications.    No Follow-up on file.    CHIEF COMPLAINT:  Chief Complaint   Patient presents with     Vaginal Discharge     1 day discharge was very water like and was light black, possible dark green.  And it smelled horrible, unlike 'normal' vaginal smell       HISTORY OF PRESENT ILLNESS:  Jolie is a 48 y.o. female here today for evaluation of vaginal discharge.  Patient had only one day of discharge and it was watery-like and greenish.  It was very foul-smelling but then everything resolved.  She thought maybe she could have had a cyst that ruptured which caused drainage like this.  She has had no ongoing symptoms.  No urinary tract symptoms.  She would also like to discuss headache changes related to perimenopause.  She has been getting consistent headaches may be once or twice a month for the last several months which she relates to having more perimenopausal symptoms in the last little bit of time.  She has had migraines in the past related to pregnancies so they would be likely hormonal.  Has been using ibuprofen to treat them which is helpful but is not always supporting them as quickly as she would like.  Has been taking sometimes up to the whole day for them to resolve and sometimes they do not resolve for a few days.  She has some light sensitivity with these headaches no other neurological changes or symptoms.  Headache  features are similar to what she remembers from pregnancies.    REVIEW OF SYSTEMS:      Pertinent items are noted in HPI.  A 12 point comprehensive review of systems was negative except as noted.  All other systems are negative  PFSH:  Reviewed, no changes      TOBACCO USE:  Social History     Tobacco Use   Smoking Status Former Smoker     Types: Cigarettes     Last attempt to quit:      Years since quittin.1   Smokeless Tobacco Never Used       VITALS:  Vitals:    19 0930   BP: 129/80   Patient Site: Left Arm   Patient Position: Sitting   Cuff Size: Adult Regular   Pulse: 73   Temp: (!) 96.3  F (35.7  C)   TempSrc: Oral   SpO2: 99%   Weight: 164 lb 12.8 oz (74.8 kg)     Wt Readings from Last 3 Encounters:   19 164 lb 12.8 oz (74.8 kg)   10/26/18 166 lb 2 oz (75.4 kg)   17 146 lb 7 oz (66.4 kg)       PHYSICAL EXAM:   /80 (Patient Site: Left Arm, Patient Position: Sitting, Cuff Size: Adult Regular)   Pulse 73   Temp (!) 96.3  F (35.7  C) (Oral)   Wt 164 lb 12.8 oz (74.8 kg)   LMP 2018 (Within Months)   SpO2 99%   BMI 23.48 kg/m    General appearance: alert, appears stated age and cooperative  Lungs: clear to auscultation bilaterally  Heart: regular rate and rhythm, S1, S2 normal, no murmur, click, rub or gallop  Pelvic: external genitalia normal, rectovaginal septum normal and vagina normal without discharge  Neurologic: Grossly normal  Psychologic: Mood and affect normal.      DATA REVIEWED:  Additional History from Old Records Summarized (2): 0  Decision to Obtain Records (1): 0  Radiology Tests Summarized or Ordered (1): 0  Labs Reviewed or Ordered (1): 0  Medicine Test Summarized or Ordered (1): 0  Independent Review of EKG or X-RAY(2 each): 0    The visit lasted a total of 30 minutes face to face with the patient. Over 50% of the time was spent counseling and educating the patient about estefany.    MEDICATIONS:  Current Outpatient Medications   Medication Sig Dispense  Refill     ferrous gluconate 324 mg (37.5 mg iron) Tab Take 324 mg by mouth 2 (two) times a day.       sertraline (ZOLOFT) 25 MG tablet Take 1 tablet (25 mg total) by mouth daily. 90 tablet 3     fluconazole (DIFLUCAN) 150 MG tablet Take 1 tablet (150 mg total) by mouth every other day. 2 tablet 0     SUMAtriptan (IMITREX) 25 MG tablet Take 1 tablet (25 mg total) by mouth every 2 (two) hours as needed for migraine. 12 tablet 0     vit B cmplx 3-FA-vit C-biotin (NEPHRO-TYREL) 1- mg-mg-mcg Tab tablet Take 1 tablet by mouth daily.       No current facility-administered medications for this visit.        This note has been dictated using voice recognition software. Any grammatical or context distortions are unintentional and inherent to the software

## 2021-06-24 NOTE — TELEPHONE ENCOUNTER
If patient is able to drink and eat without difficulty I would continue to monitor.  If patient feels that there is still a foreign object that is stuck I would recommend to be seen for further evaluation.

## 2021-06-25 NOTE — PROGRESS NOTES
ASSESSMENT & PLAN:  1. Anxiety  Certainly not getting controlled with sertraline 25 mg daily and we discussed this is an extremely low dose recommend she increase to 50 mg daily and recheck in 1 month.  We discussed that bruxism can be a side effect, or could be simply related to underlying anxiety and stress.  Will reevaluate in 1 month.  Discussed that medications can be added to help with the side effect of bruxism if it persists.  - sertraline (ZOLOFT) 50 MG tablet; Take 1 tablet (50 mg total) by mouth daily.  Dispense: 90 tablet; Refill: 0  - Thyroid Cascade    2. Thyroid nodule  Thyroid ultrasound and TSH ordered.  - US Thyroid; Future    3. Spotting  She cannot remember when her last period was, is quite certain it was over a year ago but continues to have intermittent light spotting.  Pelvic ultrasound for irregular bleeding.  - US Pelvis With Transvaginal Non OB; Future    4. Anemia, unspecified type  This was felt to be due to menorrhagia and she has not had a period for a year.  CBC today, and then would recommend she stop her iron and would expect it to remain stable if it was related to the menorrhagia.  Recheck in 1 month.  If that hemoglobin is stable plan to recheck again in 3 months.  If hemoglobin starts to decline she needs further workup for other causes of iron deficiency anemia.  - HM2(CBC w/o Differential)    5. Globus sensation, difficulty swallowing secondary to discomfort, feeling like pills are stuck  Patient reassured that this is likely just a persisting sensation.  Recommend further evaluation by ENT, referral placed.  - Ambulatory referral to ENT    6. Migraines  If she feels a 25 mg sumatriptan is not sufficient, she should start with 50 mg at first sign of migraine.  We can increase the dose of her prescription if she finds this to be more helpful.  Discussed that she could repeat the dose once after 2 hours.      Patient Instructions   Increase sertraline to 50mg daily. This is  still a relatively low dose, so could be increased if needed. Follow up in 4 weeks to see how it is going.     If needed for migraines, you could take 50mg of sumatriptan at first sign of migraine. Let us know if you would like increased woodall with next rx.    Stop iron and we will recheck in one month. If you were anemic from heavy periods, your hemoglobin should not decline. If stable in one month, we will have you remain off iron and recheck again in 3 months.       Orders Placed This Encounter   Procedures     US Thyroid     Standing Status:   Future     Standing Expiration Date:   3/19/2020     Order Specific Question:   Reason for Exam (Describe Symptoms):     Answer:   thyroid nodules on u/s 2017     Order Specific Question:   Is the patient pregnant?     Answer:   No     Order Specific Question:   Can the procedure be changed per Radiologist protocol?     Answer:   Yes     US Pelvis With Transvaginal Non OB     Standing Status:   Future     Standing Expiration Date:   3/19/2020     Order Specific Question:   Is the patient pregnant?     Answer:   No     Order Specific Question:   Can the procedure be changed per Radiologist protocol?     Answer:   Yes     Thyroid Cascade     HM2(CBC w/o Differential)     Ambulatory referral to ENT     Referral Priority:   Routine     Referral Type:   Consultation     Referral Reason:   Evaluation and Treatment     Requested Specialty:   Otolaryngology     Number of Visits Requested:   1     Medications Discontinued During This Encounter   Medication Reason     fluconazole (DIFLUCAN) 150 MG tablet Therapy completed     sertraline (ZOLOFT) 25 MG tablet Reorder       Return in about 4 weeks (around 4/16/2019).    CHIEF COMPLAINT:  Chief Complaint   Patient presents with     Sensation of something stuck in throat     First happened about a month ago while taking night time pills without much water.  Felt like that for about 3 days.  Has happened a couple of times since.  Sensation  went away last time, but still feels it this time.        HISTORY OF PRESENT ILLNESS:  Jolie is a 49 y.o. female presenting to the clinic today for recurrent sensation of pill stuck in her throat.  Other issues are discussed as below.    Throat symptoms: About a month ago took 2 of her daily pills without water, which she has done in the past without problem, and felt like they got stuck in her throat.  Felt pretty persistent for about 24 hours and then gradually resolved after another day.  During that time was able to continue to eat and drink without issue, just had the sensation that it was still in her throat.  One week ago when she took her pills, this time with water, similar sensation.  Now she has been feeling it intermittently, although mild, feels like there is still something there.  No choking.  Able to swallow solids and liquids without difficulty.  Feeling quite anxious about this sensation.  Denies reflux.  Former smoker from her 20s to late 30s approximately 1 pack/week.  No chewing tobacco.  No family history of thyroid disorder.  Personal history of thyroid nodules approximately 1 cm in each lobe 2017, was advised to follow-up ultrasound but has not done that.  Ultrasound reviewed today.    Anxiety: Started on sertraline 25 mg daily she believes in January.  Had been on this medication in the past by her report.  Having more symptoms anxiety, especially over the last couple of months.  A month ago scuba diving in [place], something she was previously passionate about but had not done for many years.  Had what she feels was a mild panic attack under water.  She was surprised by this.  Finds herself worrying a lot about various things.  Some jaw pain, clenching the more recently.  Unclear if related to medication or stress.  Uses a guard at night.    Migraines: Has a migraine today, took her sumatriptan 25 mg but wonders if she can repeat the dose, is a 25 mg does not seem to help.    Anemia: Workup  in the past for anemia.  Her hemoglobin was quite low at 8.8.  Found to be iron deficient.  Because was thought to be heavy periods, as she describes quite heavy periods in the past.  Has not had a period for about a year, but does have occasional light spotting.  Saw hematology for anemia workup, had a colonoscopy that was negative, pelvic ultrasound unremarkable.  Hematology consult was 17.  Notes reviewed, pelvic ultrasound reviewed.  Continues to take iron daily.    REVIEW OF SYSTEMS:   All other systems are negative.    PFSH:  Patient Active Problem List   Diagnosis     Allergies     Blood In Urine     Premenstrual Disorder     Fatigue     Headache     Hypothyroidism     Sinusitis     Acute Upper Respiratory Infection     Menorrhagia     Ovarian Cyst     Abdominal Pain     Anemia     Anxiety     Multiple thyroid nodules        TOBACCO USE:  Social History     Tobacco Use   Smoking Status Former Smoker     Types: Cigarettes     Last attempt to quit:      Years since quittin.2   Smokeless Tobacco Never Used       VITALS:  Vitals:    19 0900   BP: 130/85   Pulse: 72   Resp: 20   Temp: 98.1  F (36.7  C)   TempSrc: Oral   SpO2: 99%   Weight: 165 lb 6.4 oz (75 kg)     Wt Readings from Last 3 Encounters:   19 165 lb 6.4 oz (75 kg)   19 164 lb 12.8 oz (74.8 kg)   10/26/18 166 lb 2 oz (75.4 kg)     Body mass index is 23.56 kg/m .    PHYSICAL EXAM:  GENERAL: Pleasant, well-appearing patient in no acute distress.   HEENT: Pupils equal round. Sclerae and conjunctivae clear. Oropharynx is clear with moist mucous membranes.   NECK: Supple without lymphadenopathy, no obvious thyroid abnormalities on palpation  CARDIOVASCULAR: Heart regular rate and rhythm without murmur normal S1-S2   LUNGS: Clear to auscultation bilaterally, good air movement throughout   EXTREMITIES Warm and well-perfused without edema.  NEURO: Alert and oriented. Grossly nonfocal.   PSYCHIATRIC: Presents on time and well  groomed. Normal speech and thought content. Full affect. No abnormal movements or behaviors noted.  Appears somewhat anxious and tearful at times.        MEDICATIONS:  Current Outpatient Medications   Medication Sig Dispense Refill     ferrous gluconate 324 mg (37.5 mg iron) Tab Take 324 mg by mouth 2 (two) times a day.       sertraline (ZOLOFT) 50 MG tablet Take 1 tablet (50 mg total) by mouth daily. 90 tablet 0     SUMAtriptan (IMITREX) 25 MG tablet Take 1 tablet (25 mg total) by mouth every 2 (two) hours as needed for migraine. 12 tablet 0     No current facility-administered medications for this visit.

## 2021-06-25 NOTE — PATIENT INSTRUCTIONS - HE
Increase sertraline to 50mg daily. This is still a relatively low dose, so could be increased if needed. Follow up in 4 weeks to see how it is going.     If needed for migraines, you could take 50mg of sumatriptan at first sign of migraine. Let us know if you would like increased woodall with next rx.    Stop iron and we will recheck in one month. If you were anemic from heavy periods, your hemoglobin should not decline. If stable in one month, we will have you remain off iron and recheck again in 3 months.

## 2021-06-26 ENCOUNTER — HEALTH MAINTENANCE LETTER (OUTPATIENT)
Age: 51
End: 2021-06-26

## 2021-07-22 ENCOUNTER — RECORDS - HEALTHEAST (OUTPATIENT)
Dept: SCHEDULING | Facility: CLINIC | Age: 51
End: 2021-07-22

## 2021-07-22 DIAGNOSIS — Z12.31 OTHER SCREENING MAMMOGRAM: ICD-10-CM

## 2021-10-16 ENCOUNTER — HEALTH MAINTENANCE LETTER (OUTPATIENT)
Age: 51
End: 2021-10-16

## 2022-01-04 ENCOUNTER — E-VISIT (OUTPATIENT)
Dept: FAMILY MEDICINE | Facility: CLINIC | Age: 52
End: 2022-01-04
Payer: COMMERCIAL

## 2022-01-04 ENCOUNTER — E-VISIT (OUTPATIENT)
Dept: FAMILY MEDICINE | Facility: CLINIC | Age: 52
End: 2022-01-04

## 2022-01-04 DIAGNOSIS — E04.2 MULTIPLE THYROID NODULES: Primary | ICD-10-CM

## 2022-01-04 DIAGNOSIS — F41.9 ANXIETY: Primary | ICD-10-CM

## 2022-01-04 PROCEDURE — 99421 OL DIG E/M SVC 5-10 MIN: CPT | Performed by: FAMILY MEDICINE

## 2022-01-04 PROCEDURE — 99207 PR NON-BILLABLE SERV PER CHARTING: CPT | Performed by: NURSE PRACTITIONER

## 2022-01-04 RX ORDER — SUMATRIPTAN 50 MG/1
TABLET, FILM COATED ORAL
COMMUNITY
Start: 2021-10-27 | End: 2023-07-10

## 2022-01-04 RX ORDER — VENLAFAXINE HYDROCHLORIDE 75 MG/1
75 CAPSULE, EXTENDED RELEASE ORAL DAILY
Qty: 90 CAPSULE | Refills: 1 | Status: SHIPPED | OUTPATIENT
Start: 2022-01-04 | End: 2022-01-06

## 2022-01-04 RX ORDER — VENLAFAXINE HYDROCHLORIDE 75 MG/1
CAPSULE, EXTENDED RELEASE ORAL
COMMUNITY
Start: 2021-10-09 | End: 2022-01-04

## 2022-01-04 ASSESSMENT — ANXIETY QUESTIONNAIRES
7. FEELING AFRAID AS IF SOMETHING AWFUL MIGHT HAPPEN: NOT AT ALL
1. FEELING NERVOUS, ANXIOUS, OR ON EDGE: NOT AT ALL
6. BECOMING EASILY ANNOYED OR IRRITABLE: SEVERAL DAYS
4. TROUBLE RELAXING: SEVERAL DAYS
7. FEELING AFRAID AS IF SOMETHING AWFUL MIGHT HAPPEN: NOT AT ALL
GAD7 TOTAL SCORE: 2
2. NOT BEING ABLE TO STOP OR CONTROL WORRYING: NOT AT ALL
GAD7 TOTAL SCORE: 2
5. BEING SO RESTLESS THAT IT IS HARD TO SIT STILL: NOT AT ALL
8. IF YOU CHECKED OFF ANY PROBLEMS, HOW DIFFICULT HAVE THESE MADE IT FOR YOU TO DO YOUR WORK, TAKE CARE OF THINGS AT HOME, OR GET ALONG WITH OTHER PEOPLE?: NOT DIFFICULT AT ALL
3. WORRYING TOO MUCH ABOUT DIFFERENT THINGS: NOT AT ALL
GAD7 TOTAL SCORE: 2

## 2022-01-04 NOTE — TELEPHONE ENCOUNTER
I have not seen patient since 2018-requested she send visit to PCP.    Provider E-Visit time total (minutes): 1

## 2022-01-05 ASSESSMENT — ANXIETY QUESTIONNAIRES: GAD7 TOTAL SCORE: 2

## 2022-01-06 ENCOUNTER — MYC MEDICAL ADVICE (OUTPATIENT)
Dept: FAMILY MEDICINE | Facility: CLINIC | Age: 52
End: 2022-01-06
Payer: COMMERCIAL

## 2022-01-06 ENCOUNTER — APPOINTMENT (OUTPATIENT)
Dept: URGENT CARE | Facility: CLINIC | Age: 52
End: 2022-01-06
Payer: COMMERCIAL

## 2022-01-06 DIAGNOSIS — F41.9 ANXIETY: ICD-10-CM

## 2022-01-06 RX ORDER — VENLAFAXINE HYDROCHLORIDE 75 MG/1
75 CAPSULE, EXTENDED RELEASE ORAL DAILY
Qty: 90 CAPSULE | Refills: 1 | Status: SHIPPED | OUTPATIENT
Start: 2022-01-06 | End: 2023-07-10

## 2022-01-10 ENCOUNTER — ANCILLARY PROCEDURE (OUTPATIENT)
Dept: MAMMOGRAPHY | Facility: CLINIC | Age: 52
End: 2022-01-10
Attending: FAMILY MEDICINE
Payer: COMMERCIAL

## 2022-01-10 DIAGNOSIS — Z12.31 VISIT FOR SCREENING MAMMOGRAM: ICD-10-CM

## 2022-01-10 PROCEDURE — 77067 SCR MAMMO BI INCL CAD: CPT

## 2022-07-23 ENCOUNTER — HEALTH MAINTENANCE LETTER (OUTPATIENT)
Age: 52
End: 2022-07-23

## 2022-10-01 ENCOUNTER — HEALTH MAINTENANCE LETTER (OUTPATIENT)
Age: 52
End: 2022-10-01

## 2023-02-10 ENCOUNTER — ANCILLARY PROCEDURE (OUTPATIENT)
Dept: MAMMOGRAPHY | Facility: CLINIC | Age: 53
End: 2023-02-10
Attending: FAMILY MEDICINE
Payer: COMMERCIAL

## 2023-02-10 DIAGNOSIS — Z12.31 VISIT FOR SCREENING MAMMOGRAM: ICD-10-CM

## 2023-02-10 PROCEDURE — 77067 SCR MAMMO BI INCL CAD: CPT

## 2023-06-27 ENCOUNTER — TRANSFERRED RECORDS (OUTPATIENT)
Dept: HEALTH INFORMATION MANAGEMENT | Facility: CLINIC | Age: 53
End: 2023-06-27
Payer: COMMERCIAL

## 2023-06-27 LAB
ALT SERPL-CCNC: 13 IU/L (ref 0–32)
AST SERPL-CCNC: 21 IU/L (ref 0–40)
CHOLESTEROL (EXTERNAL): 216 MG/DL (ref 100–199)
CREATININE (EXTERNAL): 1.09 MG/DL (ref 0.57–1)
GFR ESTIMATED (EXTERNAL): 61 ML/MIN/1.73
GLUCOSE (EXTERNAL): 88 MG/DL (ref 70–99)
HDLC SERPL-MCNC: 80 MG/DL
LDL CHOLESTEROL CALCULATED (EXTERNAL): 122 MG/DL (ref 0–99)
NON HDL CHOLESTEROL (EXTERNAL): 136 MG/DL (ref 0–129)
POTASSIUM (EXTERNAL): 4.2 MMOL/L (ref 3.5–5.2)
TRIGLYCERIDES (EXTERNAL): 79 MG/DL (ref 0–149)

## 2023-07-10 ENCOUNTER — OFFICE VISIT (OUTPATIENT)
Dept: FAMILY MEDICINE | Facility: CLINIC | Age: 53
End: 2023-07-10
Payer: COMMERCIAL

## 2023-07-10 VITALS
OXYGEN SATURATION: 99 % | SYSTOLIC BLOOD PRESSURE: 130 MMHG | WEIGHT: 166 LBS | HEART RATE: 54 BPM | DIASTOLIC BLOOD PRESSURE: 83 MMHG | BODY MASS INDEX: 23.77 KG/M2 | HEIGHT: 70 IN

## 2023-07-10 DIAGNOSIS — E78.2 MIXED HYPERLIPIDEMIA: ICD-10-CM

## 2023-07-10 DIAGNOSIS — R03.0 ELEVATED BLOOD PRESSURE READING WITHOUT DIAGNOSIS OF HYPERTENSION: ICD-10-CM

## 2023-07-10 DIAGNOSIS — N18.2 CKD (CHRONIC KIDNEY DISEASE) STAGE 2, GFR 60-89 ML/MIN: ICD-10-CM

## 2023-07-10 DIAGNOSIS — Z12.4 CERVICAL CANCER SCREENING: ICD-10-CM

## 2023-07-10 DIAGNOSIS — F41.9 ANXIETY: Primary | ICD-10-CM

## 2023-07-10 PROCEDURE — 87624 HPV HI-RISK TYP POOLED RSLT: CPT | Performed by: FAMILY MEDICINE

## 2023-07-10 PROCEDURE — 99214 OFFICE O/P EST MOD 30 MIN: CPT | Performed by: FAMILY MEDICINE

## 2023-07-10 PROCEDURE — G0145 SCR C/V CYTO,THINLAYER,RESCR: HCPCS | Performed by: FAMILY MEDICINE

## 2023-07-10 RX ORDER — ESCITALOPRAM OXALATE 10 MG/1
10 TABLET ORAL DAILY
Qty: 90 TABLET | Refills: 3 | Status: SHIPPED | OUTPATIENT
Start: 2023-07-10

## 2023-07-10 RX ORDER — ESCITALOPRAM OXALATE 10 MG/1
TABLET ORAL
COMMUNITY
Start: 2023-06-16 | End: 2023-07-10

## 2023-07-10 ASSESSMENT — ANXIETY QUESTIONNAIRES
IF YOU CHECKED OFF ANY PROBLEMS ON THIS QUESTIONNAIRE, HOW DIFFICULT HAVE THESE PROBLEMS MADE IT FOR YOU TO DO YOUR WORK, TAKE CARE OF THINGS AT HOME, OR GET ALONG WITH OTHER PEOPLE: SOMEWHAT DIFFICULT
GAD7 TOTAL SCORE: 3
6. BECOMING EASILY ANNOYED OR IRRITABLE: SEVERAL DAYS
1. FEELING NERVOUS, ANXIOUS, OR ON EDGE: SEVERAL DAYS
2. NOT BEING ABLE TO STOP OR CONTROL WORRYING: NOT AT ALL
5. BEING SO RESTLESS THAT IT IS HARD TO SIT STILL: NOT AT ALL
GAD7 TOTAL SCORE: 3
4. TROUBLE RELAXING: SEVERAL DAYS
3. WORRYING TOO MUCH ABOUT DIFFERENT THINGS: NOT AT ALL
7. FEELING AFRAID AS IF SOMETHING AWFUL MIGHT HAPPEN: NOT AT ALL

## 2023-07-10 ASSESSMENT — PATIENT HEALTH QUESTIONNAIRE - PHQ9: SUM OF ALL RESPONSES TO PHQ QUESTIONS 1-9: 1

## 2023-07-10 NOTE — PROGRESS NOTES
Assessment & Plan     Anxiety  Doing well on Lexapro 10 mg daily, refills provided.  - escitalopram (LEXAPRO) 10 MG tablet  Dispense: 90 tablet; Refill: 3    Mixed hyperlipidemia  Reviewed outside blood work from her employers annual physical which revealed hyperlipidemia, though ASCVD risk remains low at 1.2%.  Reviewed dietary/lifestyle modifications to prevent progression.    CKD (chronic kidney disease) stage 2, GFR 60-89 ml/min  Mild CKD with baseline creatinine 1.95-1.1 (GFR 61 on outside labs).  This has been stable throughout the years, and likely began after initial insult 15 years ago after the birth of her daughter in which she sustained renal failure.  Recommend monitoring annually.    Elevated blood pressure without diagnosis of hypertension  Blood pressure within goal today, but slightly elevated at home.  Recommend  scheduling nurse BP check to compare cuffs to ensure her home cuff is accurate.  Consider ACE inhibitor initiation if consistently greater than 140/90, particularly given CKD history.    Cervical cancer screening  - Pap screen with HPV - recommended age 30 - 65 years      Carlita Velasquez [facility]    Arianna Dave is a 53 year old, presenting for the following health issues:  Follow Up (Follow up blood work from screening)        7/10/2023     7:58 AM   Additional Questions   Roomed by LEA Munson CMA   Accompanied by -     History of Present Illness       CKD: She uses over the counter pain medication, including ibu, a few times a month.    Mental Health Follow-up:  Patient presents to follow-up on Anxiety.    Patient's anxiety since last visit has been:  Medium  The patient is not having other symptoms associated with anxiety.  Any significant life events: No  Patient is feeling anxious or having panic attacks.  Patient has no concerns about alcohol or drug use.    Hyperlipidemia:  She presents for follow up of hyperlipidemia.  She is not taking  "medication to lower cholesterol. She is not having myalgia or other side effects to statin medications.    Hypertension: She presents for follow up of hypertension.  She does check blood pressure  regularly outside of the clinic. Outside blood pressures have been over 140/90. She does not follow a low salt diet.     She eats 2-3 servings of fruits and vegetables daily.She consumes 1 sweetened beverage(s) daily.She exercises with enough effort to increase her heart rate 30 to 60 minutes per day.  She exercises with enough effort to increase her heart rate 4 days per week.   She is taking medications regularly.  Today's JHONY-7 Score: 3     Previously had been on venlafaxine for anxiety.  This was difficult to come off of, was also taking sumatriptan.  Was noticing increased irritation. started on lexapro 4 weeks ago through doctor on demand.  Feels this has leveled her out and improved her irritability.  Would like to continue this medication.    After daughter was born 15 years ago, kidneys failed. Never had to do dialysis but was hospitalized for over a week and had multiple appointments until kidneys recovered.  Most recent creatinine 1.09 through physical at work, gfr 61.  Does not take excessive NSAIDs, stays well-hydrated, feels she eats relatively well.    BP at home upper 130s to low 140s.  Has a blood pressure cuff but she is unsure if this is accurate.  Would like to avoid medication if possible.    3 screening at work noted hyperlipidemia.  Family history of CAD.  Denies chest pain.  ldl 122  Total 216  hdl 80      Objective    /83   Pulse 54   Ht 1.784 m (5' 10.25\")   Wt 75.3 kg (166 lb)   SpO2 99%   BMI 23.65 kg/m    Body mass index is 23.65 kg/m .  Physical Exam   GENERAL: healthy, alert and no distress  RESP: Breathing comfortably  CV: Extremities well-perfused   (female): normal female external genitalia, normal urethral meatus, vaginal mucosa, normal cervix/adnexa/uterus without masses or " discharge  PSYCH: mentation appears normal, affect normal/bright

## 2023-07-13 LAB
BKR LAB AP GYN ADEQUACY: NORMAL
BKR LAB AP GYN INTERPRETATION: NORMAL
BKR LAB AP HPV REFLEX: NORMAL
BKR LAB AP PREVIOUS ABNORMAL: NORMAL
PATH REPORT.COMMENTS IMP SPEC: NORMAL
PATH REPORT.COMMENTS IMP SPEC: NORMAL
PATH REPORT.RELEVANT HX SPEC: NORMAL

## 2023-07-14 LAB
HUMAN PAPILLOMA VIRUS 16 DNA: NEGATIVE
HUMAN PAPILLOMA VIRUS 18 DNA: NEGATIVE
HUMAN PAPILLOMA VIRUS FINAL DIAGNOSIS: NORMAL
HUMAN PAPILLOMA VIRUS OTHER HR: NEGATIVE

## 2023-08-06 ENCOUNTER — HEALTH MAINTENANCE LETTER (OUTPATIENT)
Age: 53
End: 2023-08-06

## 2023-10-16 ENCOUNTER — VIRTUAL VISIT (OUTPATIENT)
Dept: SLEEP MEDICINE | Facility: CLINIC | Age: 53
End: 2023-10-16
Payer: COMMERCIAL

## 2023-10-16 VITALS — HEIGHT: 70 IN | WEIGHT: 161 LBS | BODY MASS INDEX: 23.05 KG/M2

## 2023-10-16 DIAGNOSIS — F41.9 ANXIETY: ICD-10-CM

## 2023-10-16 DIAGNOSIS — R06.81 WITNESSED EPISODE OF APNEA: Primary | ICD-10-CM

## 2023-10-16 DIAGNOSIS — G47.10 HYPERSOMNIA: ICD-10-CM

## 2023-10-16 DIAGNOSIS — R06.83 SNORING: ICD-10-CM

## 2023-10-16 PROCEDURE — 99203 OFFICE O/P NEW LOW 30 MIN: CPT | Mod: VID | Performed by: INTERNAL MEDICINE

## 2023-10-16 ASSESSMENT — SLEEP AND FATIGUE QUESTIONNAIRES
HOW LIKELY ARE YOU TO NOD OFF OR FALL ASLEEP WHILE SITTING QUIETLY AFTER LUNCH WITHOUT ALCOHOL: WOULD NEVER DOZE
HOW LIKELY ARE YOU TO NOD OFF OR FALL ASLEEP WHILE SITTING INACTIVE IN A PUBLIC PLACE: WOULD NEVER DOZE
HOW LIKELY ARE YOU TO NOD OFF OR FALL ASLEEP WHILE SITTING AND TALKING TO SOMEONE: WOULD NEVER DOZE
HOW LIKELY ARE YOU TO NOD OFF OR FALL ASLEEP IN A CAR, WHILE STOPPED FOR A FEW MINUTES IN TRAFFIC: WOULD NEVER DOZE
HOW LIKELY ARE YOU TO NOD OFF OR FALL ASLEEP WHILE SITTING AND READING: SLIGHT CHANCE OF DOZING
HOW LIKELY ARE YOU TO NOD OFF OR FALL ASLEEP WHILE WATCHING TV: SLIGHT CHANCE OF DOZING
HOW LIKELY ARE YOU TO NOD OFF OR FALL ASLEEP WHEN YOU ARE A PASSENGER IN A CAR FOR AN HOUR WITHOUT A BREAK: SLIGHT CHANCE OF DOZING
HOW LIKELY ARE YOU TO NOD OFF OR FALL ASLEEP WHILE LYING DOWN TO REST IN THE AFTERNOON WHEN CIRCUMSTANCES PERMIT: WOULD NEVER DOZE

## 2023-10-16 ASSESSMENT — PAIN SCALES - GENERAL: PAINLEVEL: NO PAIN (0)

## 2023-10-16 NOTE — NURSING NOTE
Has patient had flu shot for current/most recent flu season? If so, when? Yes: 10/10/2023      Is the patient currently in the state of [place]? YES    Visit mode:VIDEO    If the visit is dropped, the patient can be reconnected by: VIDEO VISIT: Text to cell phone:   Telephone Information:   Mobile [phone]       Will anyone else be joining the visit? NO  (If patient encounters technical issues they should call [phone]    How would you like to obtain your AVS? MyChart    Are changes needed to the allergy or medication list? No    Reason for visit: Consult    Lucy REYES

## 2023-10-16 NOTE — PROGRESS NOTES
Virtual Visit Details    Type of service:  Video Visit   Originating Location (pt. Location): Home  Distant Location (provider location):  Off-site  Platform used for Video Visit: [employer]    Additional 15 minutes on the date of service was spent performing the following:    -Preparing to see the patient  -Obtaining and/or reviewing separately obtained history   -Ordering medications, tests, or procedures   -Documenting clinical information in the electronic or other health record     Thank you for the opportunity to participate in the care of  Jolie Espinosa.    Assessment and Plan:    In summary Jolie Espinosa is a 53 year old year old female here for sleep disturbance.  1. Witness apnea/Hypersomnia/Snoring/Anxiety   Jolie Espinosa has high risk for obstructive sleep apnea based on the history of witness apnea, hypersomnia, snoring and a crowded airway. I educated the patient on the underlying pathophysiology of obstructive sleep apnea. We reviewed the risks associated with sleep apnea, including increased cardiovascular risk and overall death. We talked about treatments briefly. I recommend getting a Home sleep study. The patient should return to the clinic to discuss results and treatment option in a patient-centered approach.    The patient has given me permission to put in an order for CPAP if her sleep study is positive for MAC with  [facility].    History of present illness:    She is a 53 year old female who comes to the virtual clinic with a chief complaint of excessive daytime sleepiness has been going on for more than 5 years.  The patient has been informed that she has pauses in her breathing during sleep followed by loud snoring.  Despite adequate hours of sleep she would still feel tired upon awakening.  She is also being treated for anxiety.     Sleep-Wake Cycle:    TIME IN BED:    1) Work/School Days:    Do you work or go to school? Yes   What time do you usually get into bed? 10 PM    About how long does it take you to fall asleep? 1 - 2 hours   How often do you have trouble falling asleep? 7/7   How often do you wake up during the night? 7/7   Do you work days/evenings/nights/rotating shifts? Days   What wakes you up at night? Snorting self awake    Use the bathroom    Anxiety    Nightmares   How often do you have trouble falling back to sleep? 7/7   About how long does it take to fall back to sleep? ~ 1 hour   What do you usually do if you have trouble getting back to sleep? stay in bed, attempt to distract with peaceful thoughts   What time do you usually get out of bed to start your day? 7:00 AM   Do you use an alarm? Yes   2) Weekends/Non-work Days/All Other Days    What time do you usually get into bed? 10 PM   About how long does it take you to fall asleep? ~ 1 hour   What time do you usually get out of bed to start your day? 7 AM   Do you use an alarm? Yes   SLEEP NEED    On average, about how much sleep do you think you get? 7 hours   About how much sleep do you think you need? 8+ hours   SLEEP POSITION    Which sleep positions do you prefer? Side    Stomach   Do you do any of the following activities in bed? Read    Use phone, computer, or tablet   How often do you take a nap on purpose? Only when sick if able.   About how long are your naps? ~1 hour (only when sick.)   Do you feel better after naps? Yes   How often do you doze off unintentionally? Never   Have you ever had a driving accident or near-miss due to sleepiness/drowsiness? No       Stop Bang Questionnaire    Question 10/16/2023  2:05 PM CDT - Filed by Patient   Do you SNORE loudly (louder than talking or loud enough to be heard through closed doors)? Yes   Do you often feel TIRED, fatigued, or sleepy during daytime? Yes   Has anyone OBSERVED you stop breathing during your sleep? Yes   Do you have or are you being treated for high blood PRESSURE? Yes   BMI more than 35kg/m2? No   AGE over 50 years old? Yes   NECK  circumference > 16 inches (40cm)? No   GENDER: Male? No   STOP-BANG Total Score (range: 0 - 8) 6 (High risk of MAC) Critical        TIARA:  TIARA Total Score: 15  Total score - Allen: 3 (10/16/2023  2:04 PM)    Patient told to return in one week after the sleep study is interpreted.    Patient Active Problem List   Diagnosis    Allergies    Blood In Urine    Premenstrual Disorder    Fatigue    Headache    Hypothyroidism    Sinusitis    Acute Upper Respiratory Infection    Menorrhagia    Ovarian Cyst    Abdominal Pain    Anemia    Anxiety    Multiple thyroid nodules    Mixed hyperlipidemia    CKD (chronic kidney disease) stage 2, GFR 60-89 ml/min     Past Medical History:   Diagnosis Date    Other and unspecified ovarian cyst      Past Surgical History:   Procedure Laterality Date    CHOLECYSTECTOMY, LAPOROSCOPIC      HC ENLARGE BREAST WITH IMPLANT      HC REMOVAL GALLBLADDER      HC REMOVAL OF OVARIAN CYST(S)      HC REMOVAL OF OVARIAN CYST(S)      MAMMOPLASTY AUGMENTATION  2004    ZZC ENLARGE BREAST Bilateral      Current Outpatient Medications   Medication Sig Dispense Refill    escitalopram (LEXAPRO) 10 MG tablet Take 1 tablet (10 mg) by mouth daily 90 tablet 3     Iodine and Sulfa antibiotics  Social History     Socioeconomic History    Marital status:      Spouse name: Not on file    Number of children: Not on file    Years of education: Not on file    Highest education level: Not on file   Occupational History    Occupation:      Employer: [employer]   Tobacco Use    Smoking status: Former     Types: Cigarettes     Quit date: 2007     Years since quittin.8    Smokeless tobacco: Never    Tobacco comments:     quit month ago   Vaping Use    Vaping Use: Never used   Substance and Sexual Activity    Alcohol use: Yes     Comment: Alcoholic Drinks/day: 0-1    Drug use: No    Sexual activity: Yes     Partners: Male     Birth control/protection: Surgical   Other Topics  "Concern     Service Not Asked    Blood Transfusions Not Asked    Caffeine Concern Not Asked    Occupational Exposure Not Asked    Hobby Hazards Not Asked    Sleep Concern Not Asked    Stress Concern Not Asked    Weight Concern Not Asked    Special Diet Not Asked    Back Care Not Asked    Exercise Yes    Bike Helmet Not Asked    Seat Belt Not Asked    Self-Exams Not Asked   Social History Narrative    Not on file     Social Determinants of Health     Financial Resource Strain: Not on file   Food Insecurity: Not on file   Transportation Needs: Not on file   Physical Activity: Not on file   Stress: Not on file   Social Connections: Not on file   Interpersonal Safety: Not on file   Housing Stability: Not on file     Family History   Problem Relation Age of Onset    Cancer Maternal Grandmother     Cancer Maternal Grandfather     No Known Problems Mother     No Known Problems Father     No Known Problems Sister     No Known Problems Daughter     Colon Cancer Maternal Grandmother 86.00    No Known Problems Maternal Grandfather     No Known Problems Paternal Grandmother     No Known Problems Paternal Grandfather     No Known Problems Maternal Aunt     No Known Problems Paternal Aunt     No Known Problems Son     No Known Problems Son     Hereditary Breast and Ovarian Cancer Syndrome No family hx of     Breast Cancer No family hx of     Cancer No family hx of     Endometrial Cancer No family hx of     Ovarian Cancer No family hx of         Visual Exam:  GEN: NAD,   Head: Normocephalic.  EYES: EOMI  ENT: Oropharynx is clear, Du class 4+ airway.   Psych: normal mood, normal affect  Snore test:(+)     Labs/Studies:     No results found for: \"PH\", \"PHARTERIAL\", \"PO2\", \"PH9ALPDSLSN\", \"SAT\", \"PCO2\", \"HCO3\", \"BASEEXCESS\", \"WILLEM\", \"BEB\"  Lab Results   Component Value Date    TSH 3.96 03/19/2019     Lab Results   Component Value Date    GLC 43 (LL) 09/19/2019    GLC 90 10/21/2009     Lab Results   Component Value Date    " "HGB 14.5 09/19/2019    HGB 14.0 03/19/2019     Lab Results   Component Value Date    BUN 19 09/19/2019    BUN 8 10/21/2009    CR 1.10 09/19/2019    CR 0.94 10/21/2009     Lab Results   Component Value Date    AST 23 09/19/2019    AST 29 10/20/2009    ALT 12 09/19/2019    ALT 7 10/20/2009    ALKPHOS 75 09/19/2019    ALKPHOS 99 05/30/2007    BILITOTAL 0.5 09/19/2019    BILITOTAL 0.2 05/30/2007     No results found for: \"UAMP\", \"UBARB\", \"BENZODIAZEUR\", \"UCANN\", \"UCOC\", \"OPIT\", \"UPCP\"    Recent Labs   Lab Test 09/19/19  1139      POTASSIUM 3.9   CHLORIDE 104   CO2 25   ANIONGAP 11   GLC 43*   BUN 19   CR 1.10   MARLYN 9.6       Ferritin   Date Value Ref Range Status   09/19/2019 64 10 - 130 ng/mL Final       Teddy Johnston DO  Board Certified in Internal Medicine and Sleep Medicine    (Note created with Dragon voice recognition and unintended spelling errors and word substitutions may occur)     "

## 2023-10-16 NOTE — PATIENT INSTRUCTIONS
What is a Home Sleep Study?    your doctor can give you a portable sleep monitor to use at home, so you don t have to spend the night in the sleep lab. But you should use a portable monitor only if:   ?Your doctor thinks you have a condition that makes you stop breathing for short periods while you are asleep, called  sleep apnea.    ?You do not have other serious medical problems, such as heart disease or lung disease.    Please bring the home sleep study device back to the sleep center as soon as you are finished with it so we can score it.     The cost of care estimate line is [phone]. They are able to give the patient an estimate of the charges and also an estimate of their insurance coverage/patient responsibility.   After your sleep study is performed, please call us at [phone] or [phone] to schedule for a follow up to review the results of the sleep study.    Consider using one tab of low dose melatonin 3 mg or less on the night of the study.    It is completely voluntary.    Do not drive or operate machinery after intake of melatonin.     Due to the pandemic, we have many people waiting in line for sleep studies- this wait list is improving each week.   You should receive a call within 2 weeks from our staff to schedule you for  your test.   Depending on the delay in approval by your insurance carrier, the study will be completed within a few days to 2 weeks of that call.    Please make every effort you can to sleep on your back with one pillow behind your head.    Please also call your insurance company next week to see if your sleep study is approved and find out your co-pay.

## 2023-10-31 ENCOUNTER — MEDICAL CORRESPONDENCE (OUTPATIENT)
Dept: HEALTH INFORMATION MANAGEMENT | Facility: CLINIC | Age: 53
End: 2023-10-31
Payer: COMMERCIAL

## 2023-11-13 ENCOUNTER — LAB (OUTPATIENT)
Dept: LAB | Facility: HOSPITAL | Age: 53
End: 2023-11-13
Payer: OTHER GOVERNMENT

## 2023-11-13 DIAGNOSIS — N18.9 CHRONIC KIDNEY DISEASE, UNSPECIFIED: Primary | ICD-10-CM

## 2023-11-13 LAB
ALBUMIN SERPL BCG-MCNC: 4.5 G/DL (ref 3.5–5.2)
ALBUMIN UR-MCNC: NEGATIVE MG/DL
ALP SERPL-CCNC: 88 U/L (ref 35–104)
ALT SERPL W P-5'-P-CCNC: 25 U/L (ref 0–50)
ANION GAP SERPL CALCULATED.3IONS-SCNC: 11 MMOL/L (ref 7–15)
APPEARANCE UR: CLEAR
AST SERPL W P-5'-P-CCNC: 37 U/L (ref 0–45)
BILIRUB SERPL-MCNC: 0.6 MG/DL
BILIRUB UR QL STRIP: NEGATIVE
BUN SERPL-MCNC: 14.3 MG/DL (ref 6–20)
CALCIUM SERPL-MCNC: 8.9 MG/DL (ref 8.6–10)
CHLORIDE SERPL-SCNC: 105 MMOL/L (ref 98–107)
COLOR UR AUTO: NORMAL
CREAT SERPL-MCNC: 1.02 MG/DL (ref 0.51–0.95)
CREAT UR-MCNC: 91.1 MG/DL
DEPRECATED HCO3 PLAS-SCNC: 28 MMOL/L (ref 22–29)
EGFRCR SERPLBLD CKD-EPI 2021: 65 ML/MIN/1.73M2
GLUCOSE SERPL-MCNC: 97 MG/DL (ref 70–99)
GLUCOSE UR STRIP-MCNC: NEGATIVE MG/DL
HGB UR QL STRIP: NEGATIVE
KETONES UR STRIP-MCNC: NEGATIVE MG/DL
LEUKOCYTE ESTERASE UR QL STRIP: NEGATIVE
MICROALBUMIN UR-MCNC: <12 MG/L
MICROALBUMIN/CREAT UR: NORMAL MG/G{CREAT}
NITRATE UR QL: NEGATIVE
PH UR STRIP: 5.5 [PH] (ref 5–7)
POTASSIUM SERPL-SCNC: 4.5 MMOL/L (ref 3.4–5.3)
PROT SERPL-MCNC: 6.6 G/DL (ref 6.4–8.3)
SODIUM SERPL-SCNC: 144 MMOL/L (ref 135–145)
SP GR UR STRIP: 1.01 (ref 1–1.03)
UROBILINOGEN UR STRIP-MCNC: <2 MG/DL

## 2023-11-13 PROCEDURE — 81003 URINALYSIS AUTO W/O SCOPE: CPT

## 2023-11-13 PROCEDURE — 80053 COMPREHEN METABOLIC PANEL: CPT

## 2023-11-13 PROCEDURE — 82570 ASSAY OF URINE CREATININE: CPT

## 2023-11-13 PROCEDURE — 36415 COLL VENOUS BLD VENIPUNCTURE: CPT

## 2023-11-28 ASSESSMENT — SLEEP AND FATIGUE QUESTIONNAIRES
HOW LIKELY ARE YOU TO NOD OFF OR FALL ASLEEP IN A CAR, WHILE STOPPED FOR A FEW MINUTES IN TRAFFIC: WOULD NEVER DOZE
HOW LIKELY ARE YOU TO NOD OFF OR FALL ASLEEP WHILE LYING DOWN TO REST IN THE AFTERNOON WHEN CIRCUMSTANCES PERMIT: HIGH CHANCE OF DOZING
HOW LIKELY ARE YOU TO NOD OFF OR FALL ASLEEP WHILE SITTING AND READING: HIGH CHANCE OF DOZING
HOW LIKELY ARE YOU TO NOD OFF OR FALL ASLEEP WHILE SITTING QUIETLY AFTER LUNCH WITHOUT ALCOHOL: SLIGHT CHANCE OF DOZING
HOW LIKELY ARE YOU TO NOD OFF OR FALL ASLEEP WHEN YOU ARE A PASSENGER IN A CAR FOR AN HOUR WITHOUT A BREAK: HIGH CHANCE OF DOZING
HOW LIKELY ARE YOU TO NOD OFF OR FALL ASLEEP WHILE WATCHING TV: HIGH CHANCE OF DOZING
HOW LIKELY ARE YOU TO NOD OFF OR FALL ASLEEP WHILE SITTING INACTIVE IN A PUBLIC PLACE: MODERATE CHANCE OF DOZING
HOW LIKELY ARE YOU TO NOD OFF OR FALL ASLEEP WHILE SITTING AND TALKING TO SOMEONE: WOULD NEVER DOZE

## 2023-11-30 ENCOUNTER — OFFICE VISIT (OUTPATIENT)
Dept: SLEEP MEDICINE | Facility: CLINIC | Age: 53
End: 2023-11-30
Attending: INTERNAL MEDICINE
Payer: COMMERCIAL

## 2023-11-30 VITALS — HEIGHT: 70 IN | BODY MASS INDEX: 23.37 KG/M2 | WEIGHT: 163.2 LBS

## 2023-11-30 DIAGNOSIS — R06.83 SNORING: ICD-10-CM

## 2023-11-30 DIAGNOSIS — R06.81 WITNESSED EPISODE OF APNEA: ICD-10-CM

## 2023-11-30 DIAGNOSIS — G47.10 HYPERSOMNIA: ICD-10-CM

## 2023-11-30 DIAGNOSIS — F41.9 ANXIETY: ICD-10-CM

## 2023-11-30 PROCEDURE — G0399 HOME SLEEP TEST/TYPE 3 PORTA: HCPCS | Performed by: INTERNAL MEDICINE

## 2023-11-30 NOTE — PROGRESS NOTES
Pt is completing a home sleep test. Pt was instructed on how to put on the Noxturnal T3 device and associated equipment before going to bed and given the opportunity to practice putting it on before leaving the sleep center. Pt was reminded to bring the home sleep test kit back to the center tomorrow, at agreed upon time for download and reporting.   Neck circumference: 35 CM / 13.75 inches.   Nicole Orona MA

## 2023-12-01 ENCOUNTER — DOCUMENTATION ONLY (OUTPATIENT)
Dept: SLEEP MEDICINE | Facility: CLINIC | Age: 53
End: 2023-12-01
Attending: INTERNAL MEDICINE
Payer: COMMERCIAL

## 2023-12-01 NOTE — PROGRESS NOTES
Pt returned HST device. It was downloaded and forwarded data to the clinical specialist for scoring.         Nereida Valerio

## 2023-12-01 NOTE — PROGRESS NOTES
HST POST-STUDY QUESTIONNAIRE    What time did you go to bed?  10:00  How long do you think it took to fall asleep?  60 minutes   What time did you wake up to start the day?  7:30  Did you get up during the night at all?  NO  If you woke up, do you remember approximately what time(s)? NA  Did you have any difficulty with the equipment?  No  Did you us any type of treatment with this study?  None  Was the head of the bed elevated? No  Did you sleep in a recliner?  No  Did you stop using CPAP at least 3 days before this test?  NA  Any other information you'd like us to know? NA

## 2023-12-06 NOTE — PROGRESS NOTES
This HSAT was performed using a Noxturnal T3 device which recorded snore, sound, movement activity, body position, nasal pressure, oronasal thermal airflow, pulse, oximetry and both chest and abdominal respiratory effort. HSAT data was restricted to the time patient states they were in bed.     HSAT was scored using 1B 4% hypopnea rule.     HST AHI (Non-PAT): 2.8  Snoring was reported as mild.  Time with SpO2 below 89% was 0 minutes.   Overall signal quality was good.     Pt will follow up with sleep provider to determine appropriate therapy.

## 2023-12-07 NOTE — PROCEDURES
"HOME SLEEP STUDY INTERPRETATION    Patient: Jolie Espinosa  MRN: [id number]  YOB: 1970  Study Date: 11/30/2023  Referring Provider: Chloe Grimes MD  Ordering Provider: Teddy Johnston DO     Indications for Home Study: Jolie Espinosa is a 53 year old female who presents with symptoms suggestive of obstructive sleep apnea.    Estimated body mass index is 23.25 kg/m  as calculated from the following:    Height as of this encounter: 1.784 m (5' 10.25\").    Weight as of this encounter: 74 kg (163 lb 3.2 oz).  Total score - [place]: 15 (11/28/2023  9:57 AM)  Total Score: 4 (11/28/2023  9:58 AM)    Data: A full night home sleep study was performed recording the standard physiologic parameters including body position, movement, sound, nasal pressure, thermal oral airflow, chest and abdominal movements with respiratory inductance plethysmography, and oxygen saturation by pulse oximetry. Pulse rate was estimated by oximetry recording. This study was considered adequate based on > 4 hours of quality oximetry and respiratory recording. As specified by the AASM Manual for the Scoring of Sleep and Associated events, version 2.3, Rule VIII.D 1B, 4% oxygen desaturation scoring for hypopneas is used as a standard of care on all home sleep apnea testing.    Analysis Time:  490 minutes    Respiration:   Sleep Associated Hypoxemia: sustained hypoxemia was not present. Baseline oxygen saturation was 98%.  Time with saturation less than or equal to 88% was 0 minutes. The lowest oxygen saturation was 90%.   Snoring: Snoring was present.  Respiratory events: The home study revealed a presence of 6 obstructive apneas and 1 mixed and central apneas. There were 16 hypopneas resulting in a combined apnea/hypopnea index [AHI] of 2.8 events per hour.  AHI was 3.7 per hour supine, N/A per hour prone, 1 per hour on left side, and 5.3 per hour on right side.   Pattern: Excluding events noted above, respiratory rate and " pattern was Normal.    Position: Percent of time spent: supine - 23.8%, prone - 0%, on left - 449%, on right - 27.9%.    Heart Rate: By pulse oximetry normal rate was noted.     Assessment:   Patient did not rule in for obstructive sleep apnea.  Sleep associated hypoxemia was not present.    Recommendations:  Consider oral appliance therapy or positional therapy.  Suggest optimizing sleep hygiene and avoiding sleep deprivation.  Weight management.    Diagnosis Code(s): Snoring R06.83    Teddy Johnston DO, December 7, 2023   Diplomate, American Board of Internal Medicine, Sleep Medicine

## 2024-09-28 ENCOUNTER — HEALTH MAINTENANCE LETTER (OUTPATIENT)
Age: 54
End: 2024-09-28

## 2025-04-27 ENCOUNTER — HEALTH MAINTENANCE LETTER (OUTPATIENT)
Age: 55
End: 2025-04-27